# Patient Record
Sex: FEMALE | Race: WHITE | NOT HISPANIC OR LATINO | Employment: OTHER | URBAN - NONMETROPOLITAN AREA
[De-identification: names, ages, dates, MRNs, and addresses within clinical notes are randomized per-mention and may not be internally consistent; named-entity substitution may affect disease eponyms.]

---

## 2018-06-06 ENCOUNTER — INITIAL PRENATAL (OUTPATIENT)
Dept: OBSTETRICS AND GYNECOLOGY | Facility: CLINIC | Age: 23
End: 2018-06-06

## 2018-06-06 VITALS — BODY MASS INDEX: 25.06 KG/M2 | SYSTOLIC BLOOD PRESSURE: 120 MMHG | WEIGHT: 160 LBS | DIASTOLIC BLOOD PRESSURE: 68 MMHG

## 2018-06-06 DIAGNOSIS — Z34.91 NORMAL PREGNANCY, FIRST TRIMESTER: ICD-10-CM

## 2018-06-06 DIAGNOSIS — O36.80X0 ENCOUNTER TO DETERMINE FETAL VIABILITY OF PREGNANCY, SINGLE OR UNSPECIFIED FETUS: Primary | ICD-10-CM

## 2018-06-06 DIAGNOSIS — Z34.91 PREGNANT AND NOT YET DELIVERED IN FIRST TRIMESTER: ICD-10-CM

## 2018-06-06 PROCEDURE — 0501F PRENATAL FLOW SHEET: CPT | Performed by: NURSE PRACTITIONER

## 2018-06-06 RX ORDER — PRENATAL 71/IRON/FOLIC AC/DHA 30-1.4-2
1 CAPSULE,IMMEDIATE, DELAY RELEASE,BIPHASE ORAL DAILY
Qty: 90 CAPSULE | Refills: 3 | Status: SHIPPED | OUTPATIENT
Start: 2018-06-06 | End: 2022-02-11 | Stop reason: HOSPADM

## 2018-06-06 NOTE — PROGRESS NOTES
Chief Complaint   Patient presents with   • Initial Prenatal Visit     LMP 18, 8w5d by scan today, needs PNV, states has been trying for conception for almost 1 year         HPI  , 8w5d presents to our office today for initial prenatal visit.  She reports 1st trimester discomforts of pregnancy including n/v, fatigue.        Past Medical History:   Diagnosis Date   • Patient denies medical problems       No current outpatient prescriptions on file.   No Known Allergies   History reviewed. No pertinent surgical history.    Social History     Social History   • Marital status:      Spouse name: N/A   • Number of children: N/A   • Years of education: N/A     Occupational History   • Not on file.     Social History Main Topics   • Smoking status: Never Smoker   • Smokeless tobacco: Never Used   • Alcohol use No   • Drug use: No   • Sexual activity: Defer     Other Topics Concern   • Not on file     Social History Narrative   • No narrative on file      Family History   Problem Relation Age of Onset   • Diabetes Mother    • No Known Problems Father    • No Known Problems Daughter    • Hypertension Maternal Uncle    • Hypertension Maternal Grandmother    • Hypertension Maternal Grandfather        The following portions of the patient's history were reviewed and updated as appropriate:problem list, current medications, allergies, past family history, past medical history, past social history and past surgical history.    ROS    Pertinent items are noted in HPI, all other systems reviewed and negative    Physical Exam  /68   Wt 72.6 kg (160 lb)   LMP 2018   BMI 25.06 kg/m²        Psych: Altert and oriented to time, place and person  Mood and affect appropriate   General: well developed; well nourished  no acute distress  Head: normocephalic  Neck: The neck is supple and the trachea is midline  Musculoskeletal: Normal gait  Full range of motion  Lungs:  breathing is unlabored  Back: Negative  CVAT  Abdomen: Soft, non-tender, no organomegaly  Lower Extremities: LE: Neg edema  Genitourinary: External Genitalia without erythema, lesions, or masses, Perineum is without inflammation or lesions      MDM  Impression:  Problems/Risks: Normal Pregnancy  Discomforts of pregnancy   Tests done today: Routine NOB labs / U/A Culture / GC/CT - option for CF screening   TVS    Topics discussed: Rout NOB education including nutrition, exercise, OTCmeds, genetic screening   Phenergan  Encourage questions & answered    Tests next visit: none

## 2018-06-07 DIAGNOSIS — Z11.3 SCREENING EXAMINATION FOR STD (SEXUALLY TRANSMITTED DISEASE): Primary | ICD-10-CM

## 2018-06-07 LAB
ABO GROUP BLD: (no result)
BASOPHILS # BLD AUTO: 0 X10E3/UL (ref 0–0.2)
BASOPHILS NFR BLD AUTO: 0 %
BLD GP AB SCN SERPL QL: NEGATIVE
EOSINOPHIL # BLD AUTO: 0.1 X10E3/UL (ref 0–0.4)
EOSINOPHIL NFR BLD AUTO: 1 %
ERYTHROCYTE [DISTWIDTH] IN BLOOD BY AUTOMATED COUNT: 12.9 % (ref 12.3–15.4)
HBV SURFACE AG SERPL QL IA: NEGATIVE
HCT VFR BLD AUTO: 34.7 % (ref 34–46.6)
HGB BLD-MCNC: 12.1 G/DL (ref 11.1–15.9)
HIV 1+2 AB+HIV1 P24 AG SERPL QL IA: NON REACTIVE
IMM GRANULOCYTES # BLD: 0 X10E3/UL (ref 0–0.1)
IMM GRANULOCYTES NFR BLD: 0 %
LYMPHOCYTES # BLD AUTO: 2.5 X10E3/UL (ref 0.7–3.1)
LYMPHOCYTES NFR BLD AUTO: 29 %
MCH RBC QN AUTO: 31.3 PG (ref 26.6–33)
MCHC RBC AUTO-ENTMCNC: 34.9 G/DL (ref 31.5–35.7)
MCV RBC AUTO: 90 FL (ref 79–97)
MONOCYTES # BLD AUTO: 0.5 X10E3/UL (ref 0.1–0.9)
MONOCYTES NFR BLD AUTO: 6 %
NEUTROPHILS # BLD AUTO: 5.3 X10E3/UL (ref 1.4–7)
NEUTROPHILS NFR BLD AUTO: 64 %
PLATELET # BLD AUTO: 210 X10E3/UL (ref 150–379)
RBC # BLD AUTO: 3.86 X10E6/UL (ref 3.77–5.28)
RH BLD: NEGATIVE
RPR SER QL: NON REACTIVE
RUBV IGG SERPL IA-ACNC: 1.62 INDEX
WBC # BLD AUTO: 8.4 X10E3/UL (ref 3.4–10.8)

## 2018-06-09 LAB
BACTERIA UR CULT: NO GROWTH
BACTERIA UR CULT: NORMAL

## 2018-06-11 LAB
C TRACH RRNA SPEC QL NAA+PROBE: NEGATIVE
HSV1 DNA SPEC QL NAA+PROBE: NEGATIVE
HSV2 DNA SPEC QL NAA+PROBE: NEGATIVE
N GONORRHOEA RRNA SPEC QL NAA+PROBE: NEGATIVE
T VAGINALIS RRNA SPEC QL NAA+PROBE: NEGATIVE

## 2018-06-12 PROBLEM — Z67.91 RH NEGATIVE STATUS DURING PREGNANCY: Status: ACTIVE | Noted: 2018-06-06

## 2018-06-12 PROBLEM — O26.899 RH NEGATIVE STATUS DURING PREGNANCY: Status: ACTIVE | Noted: 2018-06-06

## 2018-07-02 ENCOUNTER — ROUTINE PRENATAL (OUTPATIENT)
Dept: OBSTETRICS AND GYNECOLOGY | Facility: CLINIC | Age: 23
End: 2018-07-02

## 2018-07-02 VITALS — WEIGHT: 163 LBS | DIASTOLIC BLOOD PRESSURE: 66 MMHG | SYSTOLIC BLOOD PRESSURE: 112 MMHG | BODY MASS INDEX: 25.53 KG/M2

## 2018-07-02 DIAGNOSIS — Z34.81 ENCOUNTER FOR SUPERVISION OF OTHER NORMAL PREGNANCY IN FIRST TRIMESTER: Primary | ICD-10-CM

## 2018-07-02 PROCEDURE — 0502F SUBSEQUENT PRENATAL CARE: CPT | Performed by: OBSTETRICS & GYNECOLOGY

## 2018-07-02 NOTE — PROGRESS NOTES
Chief Complaint   Patient presents with   • Routine Prenatal Visit     No COmplaints        HPI:   , 12w3d gestation reports doing well  SLight NVP    ROS:  See Prenatal Episode/Flowsheet  /66   Wt 73.9 kg (163 lb)   LMP 2018   BMI 25.53 kg/m²      EXAM:  EXTREMITIES:  No swelling-See Prenatal Episode/Flowsheet    ABDOMEN:  FHTs/Movement noted-See Prenatal Episode/Flowsheet    URINE GLUCOSE/PROTEIN:  See Prenatal Episode/Flowsheet    PELVIC EXAM:  See Prenatal Episode/Flowsheet  CV:  Lungs:    MDM:    Lab Results   Component Value Date    HGB 12.1 2018    RUBELLAABIGG 1.62 2018    HEPBSAG Negative 2018    ABO O 2018    RH Negative 2018    ABSCRN Negative 2018    KRS1TJJ2 Non Reactive 2018    URINECX Final report 2018       U/S:    1. IUP 12w3d  2. Routine care   3. Flinstone chewable

## 2018-07-30 ENCOUNTER — ROUTINE PRENATAL (OUTPATIENT)
Dept: OBSTETRICS AND GYNECOLOGY | Facility: CLINIC | Age: 23
End: 2018-07-30

## 2018-07-30 VITALS — DIASTOLIC BLOOD PRESSURE: 66 MMHG | WEIGHT: 163 LBS | SYSTOLIC BLOOD PRESSURE: 124 MMHG | BODY MASS INDEX: 25.53 KG/M2

## 2018-07-30 DIAGNOSIS — Z34.92 NORMAL PREGNANCY, SECOND TRIMESTER: Primary | ICD-10-CM

## 2018-07-30 PROCEDURE — 0502F SUBSEQUENT PRENATAL CARE: CPT | Performed by: NURSE PRACTITIONER

## 2018-07-30 NOTE — PROGRESS NOTES
[id number]  Chief Complaint   Patient presents with   • Routine Prenatal Visit     no complaints         HPI  , 16w3d reports she is doing well and has no c/o.      ROS  /66   Wt 73.9 kg (163 lb)   LMP 2018   BMI 25.53 kg/m²  -See Prenatal Assessment    ROS:  GI: Nausea - improved as compared to the prior visit; Constipation - No; Diarrhea - No    Neuro: Headache - No; Visual change - No      EXAM  General Appearance:  Lungs: Breathing unlabored  Abdomen:  See flow sheet for Fundal ht, FM, FHT's  LE: Neg edema    MDM  Impression:  Problems/Risk Normal Pregnancy     Tests done today: NIPS - discussed & optional  MSAFP - discussed & optional   Topics discussed: encouraged questions - call prn    Tests next visit: U/S      OB History      Para Term  AB Living    2 1 1     1    SAB TAB Ectopic Molar Multiple Live Births              1          Past Medical History:   Diagnosis Date   • Patient denies medical problems    • Rh negative status during pregnancy 2018       No past surgical history on file.    Family History   Problem Relation Age of Onset   • Diabetes Mother    • No Known Problems Father    • No Known Problems Daughter    • Hypertension Maternal Uncle    • Hypertension Maternal Grandmother    • Hypertension Maternal Grandfather        Social History     Social History   • Marital status:      Spouse name: N/A   • Number of children: N/A   • Years of education: N/A     Occupational History   • Not on file.     Social History Main Topics   • Smoking status: Never Smoker   • Smokeless tobacco: Never Used   • Alcohol use No   • Drug use: No   • Sexual activity: Defer     Other Topics Concern   • Not on file     Social History Narrative   • No narrative on file

## 2018-08-13 ENCOUNTER — ROUTINE PRENATAL (OUTPATIENT)
Dept: OBSTETRICS AND GYNECOLOGY | Facility: CLINIC | Age: 23
End: 2018-08-13

## 2018-08-13 VITALS — WEIGHT: 168 LBS | SYSTOLIC BLOOD PRESSURE: 124 MMHG | DIASTOLIC BLOOD PRESSURE: 62 MMHG | BODY MASS INDEX: 26.31 KG/M2

## 2018-08-13 DIAGNOSIS — Z34.92 SECOND TRIMESTER PREGNANCY: Primary | ICD-10-CM

## 2018-08-13 PROCEDURE — 0502F SUBSEQUENT PRENATAL CARE: CPT | Performed by: OBSTETRICS & GYNECOLOGY

## 2018-08-13 NOTE — PROGRESS NOTES
Chief Complaint   Patient presents with   • Routine Prenatal Visit     ANATOMY SCAN TODAY, NO COMPLAINTS.         HPI:   , 18w3d gestation reports doing well    ROS:  See Prenatal Episode/Flowsheet  /62   Wt 76.2 kg (168 lb)   LMP 2018   BMI 26.31 kg/m²      EXAM:  EXTREMITIES:  No swelling-See Prenatal Episode/Flowsheet    ABDOMEN:  FHTs/Movement noted-See Prenatal Episode/Flowsheet    URINE GLUCOSE/PROTEIN:  See Prenatal Episode/Flowsheet    PELVIC EXAM:  See Prenatal Episode/Flowsheet  CV:  Lungs:    MDM:    Lab Results   Component Value Date    HGB 12.1 2018    RUBELLAABIGG 1.62 2018    HEPBSAG Negative 2018    ABO O 2018    RH Negative 2018    ABSCRN Negative 2018    SDH1YMC2 Non Reactive 2018    URINECX Final report 2018       U/S: images reivewed, normal anatomy, posterior placenta, vertex  1. IUP 18w3d  2. Routine care   3. Penta today

## 2018-08-16 LAB
2ND TRIMESTER 4 SCREEN SERPL-IMP: NORMAL
2ND TRIMESTER 4 SCREEN SERPL-IMP: NORMAL
AFP ADJ MOM SERPL: 1.1
AFP SERPL-MCNC: 46 NG/ML
AGE AT DELIVERY: 25.7 YR
FET TS 18 RISK FROM MAT AGE: NORMAL
FET TS 21 RISK FROM MAT AGE: 998
GA METHOD: NORMAL
GA: 18.4 WEEKS
HCG ADJ MOM SERPL: 0.39
HCG SERPL-ACNC: 9585 MIU/ML
IDDM PATIENT QL: NO
INHIBIN A ADJ MOM SERPL: 0.62
INHIBIN A SERPL-MCNC: 98.31 PG/ML
LABORATORY COMMENT REPORT: NORMAL
MULTIPLE PREGNANCY: NO
NEURAL TUBE DEFECT RISK FETUS: 8933 %
RESULT: NORMAL
TS 18 RISK FETUS: NORMAL
TS 21 RISK FETUS: NORMAL
U ESTRIOL ADJ MOM SERPL: 1.43
U ESTRIOL SERPL-MCNC: 1.9 NG/ML

## 2018-09-10 ENCOUNTER — ROUTINE PRENATAL (OUTPATIENT)
Dept: OBSTETRICS AND GYNECOLOGY | Facility: CLINIC | Age: 23
End: 2018-09-10

## 2018-09-10 VITALS — WEIGHT: 171 LBS | SYSTOLIC BLOOD PRESSURE: 112 MMHG | BODY MASS INDEX: 26.78 KG/M2 | DIASTOLIC BLOOD PRESSURE: 64 MMHG

## 2018-09-10 DIAGNOSIS — Z34.92 PRENATAL CARE IN SECOND TRIMESTER: Primary | ICD-10-CM

## 2018-09-10 PROCEDURE — 0502F SUBSEQUENT PRENATAL CARE: CPT | Performed by: OBSTETRICS & GYNECOLOGY

## 2018-09-11 NOTE — PROGRESS NOTES
Chief Complaint   Patient presents with   • Routine Prenatal Visit     C/O SHORTNESS OF BREATH, NAUSEA, DIZZINESS AND MILD CRAMPS X 2 WEEKS       HPI:   Camille is a  currently at 22w3d who today reports the following:  Contractions - No; Leaking - No; Vaginal bleeding -  No; Swelling of extremities - No. Good fetal movement - YES.    ROS:  GI: Nausea - No; Constipation - No; Diarrhea - No. RUQ pain - No    Neuro: Headache - No; Visual disturbances - No.    The following portions of the patient's history were reviewed and updated as appropriate:problem list, current medications, allergies, past family history, past medical history, past social history and past surgical history.    EXAM:  /64   Wt 77.6 kg (171 lb)   LMP 2018   BMI 26.78 kg/m²     Gen: NAD, conversant  Pulm: No use of accessory muscles, normal respirations  Abdomen: Gravid, nontender, size = dates, + fetal cardiac activity  Ext: no edema, no rashes, WWP  Gait: normal for pregnancy  Psych: Mood, insight, judgement intact  SVE: Not performed     Lab Results   Component Value Date    ABO O 2018    RH Negative 2018    ABSCRN Negative 2018       Smoking status: Never Smoker                                                              Smokeless tobacco: Never Used                          I have reviewed the prenatal labs and previous ultrasounds today.    MDM:  Diagnosis: Supervision of low risk pregnancy   Tests/Orders today: Orders Placed This Encounter   Procedures   • Gestational Screen 1 Hr ([facility])   • CBC (No Diff)      Topics discussed: Contraception - OCPs   Breastfeeding   Tests next visit: GCT  HgB   Next visit: 4 week(s)     Travon Garcia MD  Obstetrics and Gynecology  [facility]

## 2018-10-08 ENCOUNTER — ROUTINE PRENATAL (OUTPATIENT)
Dept: OBSTETRICS AND GYNECOLOGY | Facility: CLINIC | Age: 23
End: 2018-10-08

## 2018-10-08 VITALS — WEIGHT: 175 LBS | DIASTOLIC BLOOD PRESSURE: 62 MMHG | BODY MASS INDEX: 27.41 KG/M2 | SYSTOLIC BLOOD PRESSURE: 120 MMHG

## 2018-10-08 DIAGNOSIS — Z34.92 NORMAL PREGNANCY, SECOND TRIMESTER: Primary | ICD-10-CM

## 2018-10-08 LAB
ERYTHROCYTE [DISTWIDTH] IN BLOOD BY AUTOMATED COUNT: 12.1 % (ref 11.5–14.5)
GLUCOSE 1H P 50 G GLC PO SERPL-MCNC: 99 MG/DL
HCT VFR BLD AUTO: 33 % (ref 37–47)
HGB BLD-MCNC: 11.2 G/DL (ref 12–16)
MCH RBC QN AUTO: 31.7 PG (ref 27–31)
MCHC RBC AUTO-ENTMCNC: 33.9 G/DL (ref 30–37)
MCV RBC AUTO: 93.5 FL (ref 81–99)
PLATELET # BLD AUTO: 179 10*3/MM3 (ref 130–400)
RBC # BLD AUTO: 3.53 10*6/MM3 (ref 4.2–5.4)
WBC # BLD AUTO: 8.95 10*3/MM3 (ref 4.8–10.8)

## 2018-10-08 PROCEDURE — 0502F SUBSEQUENT PRENATAL CARE: CPT | Performed by: NURSE PRACTITIONER

## 2018-10-08 NOTE — PATIENT INSTRUCTIONS
"Third Trimester of Pregnancy  The third trimester is from week 29 through week 42, months 7 through 9. The third trimester is a time when the fetus is growing rapidly. At the end of the ninth month, the fetus is about 20 inches in length and weighs 6-10 pounds.   BODY CHANGES  Your body goes through many changes during pregnancy. The changes vary from woman to woman.   · Your weight will continue to increase. You can expect to gain 25-35 pounds (11-16 kg) by the end of the pregnancy.  · You may begin to get stretch marks on your hips, abdomen, and breasts.  · You may urinate more often because the fetus is moving lower into your pelvis and pressing on your bladder.  · You may develop or continue to have heartburn as a result of your pregnancy.  · You may develop constipation because certain hormones are causing the muscles that push waste through your intestines to slow down.  · You may develop hemorrhoids or swollen, bulging veins (varicose veins).  · You may have pelvic pain because of the weight gain and pregnancy hormones relaxing your joints between the bones in your pelvis. Backaches may result from overexertion of the muscles supporting your posture.  · You may have changes in your hair. These can include thickening of your hair, rapid growth, and changes in texture. Some women also have hair loss during or after pregnancy, or hair that feels dry or thin. Your hair will most likely return to normal after your baby is born.  · Your breasts will continue to grow and be tender. A yellow discharge may leak from your breasts called colostrum.  · Your belly button may stick out.  · You may feel short of breath because of your expanding uterus.  · You may notice the fetus \"dropping,\" or moving lower in your abdomen.  · You may have a bloody mucus discharge. This usually occurs a few days to a week before labor begins.  · Your cervix becomes thin and soft (effaced) near your due date.  WHAT TO EXPECT AT YOUR PRENATAL " EXAMS   You will have prenatal exams every 2 weeks until week 36. Then, you will have weekly prenatal exams. During a routine prenatal visit:  · You will be weighed to make sure you and the fetus are growing normally.  · Your blood pressure is taken.  · Your abdomen will be measured to track your baby's growth.  · The fetal heartbeat will be listened to.  · Any test results from the previous visit will be discussed.  · You may have a cervical check near your due date to see if you have effaced.  At around 36 weeks, your caregiver will check your cervix. At the same time, your caregiver will also perform a test on the secretions of the vaginal tissue. This test is to determine if a type of bacteria, Group B streptococcus, is present. Your caregiver will explain this further.  Your caregiver may ask you:  · What your birth plan is.  · How you are feeling.  · If you are feeling the baby move.  · If you have had any abnormal symptoms, such as leaking fluid, bleeding, severe headaches, or abdominal cramping.  · If you are using any tobacco products, including cigarettes, chewing tobacco, and electronic cigarettes.  · If you have any questions.  Other tests or screenings that may be performed during your third trimester include:  · Blood tests that check for low iron levels (anemia).  · Fetal testing to check the health, activity level, and growth of the fetus. Testing is done if you have certain medical conditions or if there are problems during the pregnancy.  · HIV (human immunodeficiency virus) testing. If you are at high risk, you may be screened for HIV during your third trimester of pregnancy.  FALSE LABOR  You may feel small, irregular contractions that eventually go away. These are called Julien Uriarte contractions, or false labor. Contractions may last for hours, days, or even weeks before true labor sets in. If contractions come at regular intervals, intensify, or become painful, it is best to be seen by your  caregiver.   SIGNS OF LABOR   · Menstrual-like cramps.  · Contractions that are 5 minutes apart or less.  · Contractions that start on the top of the uterus and spread down to the lower abdomen and back.  · A sense of increased pelvic pressure or back pain.  · A watery or bloody mucus discharge that comes from the vagina.  If you have any of these signs before the 37th week of pregnancy, call your caregiver right away. You need to go to the hospital to get checked immediately.  HOME CARE INSTRUCTIONS   · Avoid all smoking, herbs, alcohol, and unprescribed drugs. These chemicals affect the formation and growth of the baby.  · Do not use any tobacco products, including cigarettes, chewing tobacco, and electronic cigarettes. If you need help quitting, ask your health care provider. You may receive counseling support and other resources to help you quit.  · Follow your caregiver's instructions regarding medicine use. There are medicines that are either safe or unsafe to take during pregnancy.  · Exercise only as directed by your caregiver. Experiencing uterine cramps is a good sign to stop exercising.  · Continue to eat regular, healthy meals.  · Wear a good support bra for breast tenderness.  · Do not use hot tubs, steam rooms, or saunas.  · Wear your seat belt at all times when driving.  · Avoid raw meat, uncooked cheese, cat litter boxes, and soil used by cats. These carry germs that can cause birth defects in the baby.  · Take your prenatal vitamins.  · Take 4431-9903 mg of calcium daily starting at the 20th week of pregnancy until you deliver your baby.  · Try taking a stool softener (if your caregiver approves) if you develop constipation. Eat more high-fiber foods, such as fresh vegetables or fruit and whole grains. Drink plenty of fluids to keep your urine clear or pale yellow.  · Take warm sitz baths to soothe any pain or discomfort caused by hemorrhoids. Use hemorrhoid cream if your caregiver approves.  · If  you develop varicose veins, wear support hose. Elevate your feet for 15 minutes, 3-4 times a day. Limit salt in your diet.  · Avoid heavy lifting, wear low heal shoes, and practice good posture.  · Rest a lot with your legs elevated if you have leg cramps or low back pain.  · Visit your dentist if you have not gone during your pregnancy. Use a soft toothbrush to brush your teeth and be gentle when you floss.  · A sexual relationship may be continued unless your caregiver directs you otherwise.  · Do not travel far distances unless it is absolutely necessary and only with the approval of your caregiver.  · Take prenatal classes to understand, practice, and ask questions about the labor and delivery.  · Make a trial run to the hospital.  · Pack your hospital bag.  · Prepare the baby's nursery.  · Continue to go to all your prenatal visits as directed by your caregiver.  SEEK MEDICAL CARE IF:  · You are unsure if you are in labor or if your water has broken.  · You have dizziness.  · You have mild pelvic cramps, pelvic pressure, or nagging pain in your abdominal area.  · You have persistent nausea, vomiting, or diarrhea.  · You have a bad smelling vaginal discharge.  · You have pain with urination.  SEEK IMMEDIATE MEDICAL CARE IF:   · You have a fever.  · You are leaking fluid from your vagina.  · You have spotting or bleeding from your vagina.  · You have severe abdominal cramping or pain.  · You have rapid weight loss or gain.  · You have shortness of breath with chest pain.  · You notice sudden or extreme swelling of your face, hands, ankles, feet, or legs.  · You have not felt your baby move in over an hour.  · You have severe headaches that do not go away with medicine.  · You have vision changes.     This information is not intended to replace advice given to you by your health care provider. Make sure you discuss any questions you have with your health care provider.     Document Released: 12/12/2002 Document  Revised: 01/08/2016 Document Reviewed: 02/18/2014  Elsevier Interactive Patient Education ©2017 [employer]

## 2018-10-08 NOTE — PROGRESS NOTES
[id number]  Chief Complaint   Patient presents with   • Routine Prenatal Visit     glucola today, no complaints         HPI  , 26w3d reports doing well.  Good FM    ROS  /62   Wt 79.4 kg (175 lb)   LMP 2018   BMI 27.41 kg/m²  -See Prenatal Assessment    ROS:      GI: Nausea - No; Constipation - No;    Diarrhea - No    Neuro: Headache - No; Visual change - No      EXAM  General Appearance:  Pleasant  Lungs: Breathing unlabored  Abdomen:  See flow sheet for Fundal ht, FM, FHT's  LE: Neg edema    MDM  Impression:  Problems/Risk Normal Pregnancy     Tests done today: 1 hr. glucola & CBC  antibody screen   Topics discussed: continue to note good FM  Rhogam in 2 wks   T-dap   encouraged questions - call prn    Tests next visit: none     OB History      Para Term  AB Living    2 1 1     1    SAB TAB Ectopic Molar Multiple Live Births              1          Past Medical History:   Diagnosis Date   • Patient denies medical problems    • Rh negative status during pregnancy 2018       Past Surgical History:   Procedure Laterality Date   • NO PAST SURGERIES         Family History   Problem Relation Age of Onset   • Diabetes Mother    • No Known Problems Father    • No Known Problems Daughter    • Hypertension Maternal Uncle    • Hypertension Maternal Grandmother    • Hypertension Maternal Grandfather        Social History     Social History   • Marital status:      Spouse name: N/A   • Number of children: N/A   • Years of education: N/A     Occupational History   • Not on file.     Social History Main Topics   • Smoking status: Never Smoker   • Smokeless tobacco: Never Used   • Alcohol use No   • Drug use: No   • Sexual activity: Defer     Other Topics Concern   • Not on file     Social History Narrative   • No narrative on file

## 2018-10-16 ENCOUNTER — TELEPHONE (OUTPATIENT)
Dept: OBSTETRICS AND GYNECOLOGY | Facility: CLINIC | Age: 23
End: 2018-10-16

## 2018-10-16 RX ORDER — AZITHROMYCIN 250 MG/1
TABLET, FILM COATED ORAL
Qty: 6 TABLET | Refills: 0 | Status: SHIPPED | OUTPATIENT
Start: 2018-10-16 | End: 2018-11-05

## 2018-10-16 NOTE — TELEPHONE ENCOUNTER
Do Z-pack 5 day   Cool mist vaporizer  Off work x 48 hrs   Option to be seen or if fever / chills / worsens - then will need to be seen.  Thanks

## 2018-10-16 NOTE — TELEPHONE ENCOUNTER
Pt called and states she has had a deep cough for 3 weeks, states her chest is really tight and when she can cough up anything it is discolored. She has tried several OTC meds with no relief. Can we send in something for her?  Thanks

## 2018-10-29 ENCOUNTER — CLINICAL SUPPORT (OUTPATIENT)
Dept: OBSTETRICS AND GYNECOLOGY | Facility: CLINIC | Age: 23
End: 2018-10-29

## 2018-10-29 VITALS
DIASTOLIC BLOOD PRESSURE: 62 MMHG | HEIGHT: 65 IN | BODY MASS INDEX: 29.16 KG/M2 | SYSTOLIC BLOOD PRESSURE: 120 MMHG | WEIGHT: 175 LBS

## 2018-10-29 DIAGNOSIS — O26.892 RH NEGATIVE STATUS DURING PREGNANCY IN SECOND TRIMESTER: Primary | ICD-10-CM

## 2018-10-29 DIAGNOSIS — Z67.91 RH NEGATIVE STATUS DURING PREGNANCY IN SECOND TRIMESTER: Primary | ICD-10-CM

## 2018-10-29 PROCEDURE — 96372 THER/PROPH/DIAG INJ SC/IM: CPT | Performed by: OBSTETRICS & GYNECOLOGY

## 2018-11-05 ENCOUNTER — ROUTINE PRENATAL (OUTPATIENT)
Dept: OBSTETRICS AND GYNECOLOGY | Facility: CLINIC | Age: 23
End: 2018-11-05

## 2018-11-05 VITALS — SYSTOLIC BLOOD PRESSURE: 124 MMHG | DIASTOLIC BLOOD PRESSURE: 68 MMHG | WEIGHT: 179 LBS | BODY MASS INDEX: 29.79 KG/M2

## 2018-11-05 DIAGNOSIS — Z34.93 NORMAL PREGNANCY, THIRD TRIMESTER: Primary | ICD-10-CM

## 2018-11-05 PROCEDURE — 0502F SUBSEQUENT PRENATAL CARE: CPT | Performed by: NURSE PRACTITIONER

## 2018-11-05 NOTE — PROGRESS NOTES
[id number]  Chief Complaint   Patient presents with   • Routine Prenatal Visit     pt states fell 2 weeks ago and is still having back pain         HPI  , 30w3d reports fell 2 wks ago - c/o right mid back pain - think its pulled muscle   Denies cramping - no vaginal bleeding  Good FM    ROS  /68   Wt 81.2 kg (179 lb)   LMP 2018   BMI 29.79 kg/m²  -See Prenatal Assessment    ROS:      GI: Nausea - No; Constipation - No;    Diarrhea - No    Neuro: Headache - No; Visual change - No      EXAM  General Appearance:  Pleasant  Back:  Neg CVAT  Lungs: Breathing unlabored  Abdomen:  See flow sheet for Fundal ht, FM, FHT's  LE: Neg edema    MDM  Impression:  Problems/Risk Normal Pregnancy  back pain   Tests done today: none   Topics discussed: continue to note good FM  Flu vaccination  T-dap   anticipate rt mid back pain to resolve - may use warm compresses - tylenol - if pain persist or worsens - will need to consider renal u/s   encouraged questions - call prn    Tests next visit: none     OB History      Para Term  AB Living    2 1 1     1    SAB TAB Ectopic Molar Multiple Live Births              1          Past Medical History:   Diagnosis Date   • Patient denies medical problems    • Rh negative status during pregnancy 2018       Past Surgical History:   Procedure Laterality Date   • NO PAST SURGERIES         Family History   Problem Relation Age of Onset   • Diabetes Mother    • No Known Problems Father    • No Known Problems Daughter    • Hypertension Maternal Uncle    • Hypertension Maternal Grandmother    • Hypertension Maternal Grandfather        Social History     Social History   • Marital status:      Spouse name: N/A   • Number of children: N/A   • Years of education: N/A     Occupational History   • Not on file.     Social History Main Topics   • Smoking status: Never Smoker   • Smokeless tobacco: Never Used   • Alcohol use No   • Drug use: No   • Sexual activity:  Defer     Other Topics Concern   • Not on file     Social History Narrative   • No narrative on file

## 2018-11-13 ENCOUNTER — ROUTINE PRENATAL (OUTPATIENT)
Dept: OBSTETRICS AND GYNECOLOGY | Facility: CLINIC | Age: 23
End: 2018-11-13

## 2018-11-13 VITALS — SYSTOLIC BLOOD PRESSURE: 124 MMHG | BODY MASS INDEX: 29.45 KG/M2 | DIASTOLIC BLOOD PRESSURE: 64 MMHG | WEIGHT: 177 LBS

## 2018-11-13 DIAGNOSIS — O47.00 PREMATURE UTERINE CONTRACTIONS: ICD-10-CM

## 2018-11-13 DIAGNOSIS — Z34.83 ENCOUNTER FOR SUPERVISION OF OTHER NORMAL PREGNANCY IN THIRD TRIMESTER: Primary | ICD-10-CM

## 2018-11-13 PROCEDURE — 0502F SUBSEQUENT PRENATAL CARE: CPT | Performed by: OBSTETRICS & GYNECOLOGY

## 2018-11-14 NOTE — PROGRESS NOTES
Chief Complaint   Patient presents with   • Pregnancy Problem     Patient complain of pressure and contractions.        HPI: Camille is a  currently at 31w5d who today reports the following:  Contractions - YES - but less than 4/hour AND no associated change in vaginal discharge; Leaking - No; Vaginal bleeding -  No; Heartburn - No.  Pt with complaints of contractions and pressure over the weekend.  Pt was unable to time but reports perhaps 3-4 per hour at most.  Pt with no bleeding or spotting; denies leaking of fluid.  Pt reports symptoms are worse with standing and activity.  Pt with no prior history of  labor; delivered at term previously.  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  ROS:   GI:   Nausea - No; Constipation - No; Diarrhea - No.   Neuro:  Headache - No; Visual disturbances - No.    EXAM:   Vitals:  See prenatal flowsheet as noted and reviewed   Abdomen:   See prenatal flowsheet as noted and reviewed   Pelvic:  See prenatal flowsheet as noted and reviewed   Urine:  See prenatal flowsheet as noted and reviewed     Lab Results   Component Value Date    ABO O 2018    RH Negative 2018    ABSCRN Negative 2018       MDM:  Impression: Supervision of low risk pregnancy   contractions with no cervical dilitation   Tests done today: none   Topics discussed: kick counts and fetal movement  PIH precautions   labor signs and symptoms - increase rest and po fluids  Instructions and precautions given; keep appt as scheduled on Monday   Tests next visit: none     This note was electronically signed.  Tatiana Gray M.D.

## 2018-11-19 ENCOUNTER — ROUTINE PRENATAL (OUTPATIENT)
Dept: OBSTETRICS AND GYNECOLOGY | Facility: CLINIC | Age: 23
End: 2018-11-19

## 2018-11-19 VITALS — WEIGHT: 179 LBS | BODY MASS INDEX: 29.79 KG/M2 | SYSTOLIC BLOOD PRESSURE: 126 MMHG | DIASTOLIC BLOOD PRESSURE: 70 MMHG

## 2018-11-19 DIAGNOSIS — Z34.93 PRENATAL CARE IN THIRD TRIMESTER: Primary | ICD-10-CM

## 2018-11-19 PROCEDURE — 0502F SUBSEQUENT PRENATAL CARE: CPT | Performed by: OBSTETRICS & GYNECOLOGY

## 2018-11-19 NOTE — PROGRESS NOTES
Chief Complaint   Patient presents with   • Pregnancy Ultrasound     Growth scan done today, No complaints       HPI:   Camille is a  currently at 32w3d who today reports the following:  Contractions - No; Leaking - No; Vaginal bleeding -  No; Swelling of extremities - No. Good fetal movement - YES.    ROS:  GI: Nausea - No; Constipation - No; Diarrhea - No. RUQ pain - No    Neuro: Headache - No; Visual disturbances - No.    Pertinent items are noted in HPI, all other systems reviewed and negative    Review of History:  The following portions of the patient's history were reviewed and updated as appropriate:problem list, current medications, allergies, past family history, past medical history, past social history and past surgical history.    Current Outpatient Medications on File Prior to Visit   Medication Sig Dispense Refill   • Aahljm-NoBrh-Nmdlm-FA-DHA w/oA (VITAPEARL) 30-1.4-200 MG capsule controlled-release Take 1 capsule by mouth Daily. 90 capsule 3     No current facility-administered medications on file prior to visit.        EXAM:  /70   Wt 81.2 kg (179 lb)   LMP 2018   BMI 29.79 kg/m²     Gen: NAD, conversant  Pulm: No use of accessory muscles, normal respirations  Abdomen: Gravid, nontender, size = dates, + fetal cardiac activity  Ext: no edema, no rashes, WWP  Gait: normal for pregnancy  Psych: Mood, insight, judgement intact  SVE: Not performed     Lab Results   Component Value Date    ABO O 2018    RH Negative 2018    ABSCRN Negative 2018       Social History    Tobacco Use      Smoking status: Never Smoker      Smokeless tobacco: Never Used      I have reviewed the prenatal labs and previous ultrasounds today.    MDM:  Diagnosis: Supervision of low risk pregnancy   Rh NEG   Tests/Orders/Rx today: IUP at 32+3 weeks. Limited anatomy was reviewed today and is normal in appearance. EFW 2032g(48%). Cephalic presentation. Placenta is posterior. Amniotic fluid and  fetal heart rate are normal.  Meds Ordered: none   Topics discussed: kick counts and fetal movement  LARC  PIH precautions   labor signs and symptoms   Tests next visit: none   Next visit: 2 week(s)     Travon Garcia MD  Obstetrics and Gynecology  [facility]

## 2018-11-20 ENCOUNTER — APPOINTMENT (OUTPATIENT)
Dept: MRI IMAGING | Facility: HOSPITAL | Age: 23
End: 2018-11-20

## 2018-11-20 ENCOUNTER — HOSPITAL ENCOUNTER (OUTPATIENT)
Facility: HOSPITAL | Age: 23
Discharge: HOME | End: 2018-11-20
Attending: NURSE PRACTITIONER | Admitting: NURSE PRACTITIONER

## 2018-11-20 VITALS
DIASTOLIC BLOOD PRESSURE: 57 MMHG | TEMPERATURE: 98.4 F | HEART RATE: 82 BPM | HEIGHT: 67 IN | RESPIRATION RATE: 14 BRPM | BODY MASS INDEX: 28.09 KG/M2 | OXYGEN SATURATION: 98 % | WEIGHT: 179 LBS | SYSTOLIC BLOOD PRESSURE: 107 MMHG

## 2018-11-20 LAB
ALBUMIN SERPL-MCNC: 3.6 G/DL (ref 3.5–5)
ALBUMIN/GLOB SERPL: 1.3 G/DL (ref 1–2)
ALP SERPL-CCNC: 74 U/L (ref 38–126)
ALT SERPL W P-5'-P-CCNC: 16 U/L (ref 13–69)
ANION GAP SERPL CALCULATED.3IONS-SCNC: 9.1 MMOL/L (ref 10–20)
AST SERPL-CCNC: 20 U/L (ref 15–46)
BILIRUB BLD-MCNC: NEGATIVE MG/DL
BILIRUB SERPL-MCNC: 0.5 MG/DL (ref 0.2–1.3)
BUN BLD-MCNC: 7 MG/DL (ref 7–20)
BUN/CREAT SERPL: 11.7 (ref 7.1–23.5)
CALCIUM SPEC-SCNC: 9.1 MG/DL (ref 8.4–10.2)
CHLORIDE SERPL-SCNC: 107 MMOL/L (ref 98–107)
CLARITY, POC: CLEAR
CO2 SERPL-SCNC: 22 MMOL/L (ref 26–30)
COLOR UR: YELLOW
CREAT BLD-MCNC: 0.6 MG/DL (ref 0.6–1.3)
DEPRECATED RDW RBC AUTO: 41.9 FL (ref 37–54)
EOSINOPHIL # BLD MANUAL: 0.19 10*3/MM3 (ref 0–0.7)
EOSINOPHIL NFR BLD MANUAL: 2 % (ref 0–7)
ERYTHROCYTE [DISTWIDTH] IN BLOOD BY AUTOMATED COUNT: 12.3 % (ref 11.5–14.5)
GFR SERPL CREATININE-BSD FRML MDRD: 122 ML/MIN/1.73
GLOBULIN UR ELPH-MCNC: 2.7 GM/DL
GLUCOSE BLD-MCNC: 82 MG/DL (ref 74–98)
GLUCOSE BLDC GLUCOMTR-MCNC: 84 MG/DL (ref 70–130)
GLUCOSE UR STRIP-MCNC: NEGATIVE MG/DL
HCT VFR BLD AUTO: 32.4 % (ref 37–47)
HGB BLD-MCNC: 11.2 G/DL (ref 12–16)
KETONES UR QL: NEGATIVE
LARGE PLATELETS: NORMAL
LEUKOCYTE EST, POC: ABNORMAL
LYMPHOCYTES # BLD MANUAL: 3.02 10*3/MM3 (ref 0.6–3.4)
LYMPHOCYTES NFR BLD MANUAL: 31 % (ref 10–50)
LYMPHOCYTES NFR BLD MANUAL: 6 % (ref 0–12)
MCH RBC QN AUTO: 32.3 PG (ref 27–31)
MCHC RBC AUTO-ENTMCNC: 34.6 G/DL (ref 30–37)
MCV RBC AUTO: 93.4 FL (ref 81–99)
MONOCYTES # BLD AUTO: 0.58 10*3/MM3 (ref 0–0.9)
NEUTROPHILS # BLD AUTO: 5.94 10*3/MM3 (ref 2–6.9)
NEUTROPHILS NFR BLD MANUAL: 60 % (ref 37–80)
NEUTS BAND NFR BLD MANUAL: 1 % (ref 0–5)
NITRITE UR-MCNC: NEGATIVE MG/ML
PH UR: 7 [PH] (ref 5–8)
PLATELET # BLD AUTO: 158 10*3/MM3 (ref 130–400)
PMV BLD AUTO: 10.8 FL (ref 6–12)
POTASSIUM BLD-SCNC: 4.1 MMOL/L (ref 3.5–5.1)
PROT SERPL-MCNC: 6.3 G/DL (ref 6.3–8.2)
PROT UR STRIP-MCNC: NEGATIVE MG/DL
RBC # BLD AUTO: 3.47 10*6/MM3 (ref 4.2–5.4)
RBC # UR STRIP: NEGATIVE /UL
RBC MORPH BLD: NORMAL
SMALL PLATELETS BLD QL SMEAR: ADEQUATE
SODIUM BLD-SCNC: 134 MMOL/L (ref 137–145)
SP GR UR: 1 (ref 1–1.03)
UROBILINOGEN UR QL: NORMAL
WBC MORPH BLD: NORMAL
WBC NRBC COR # BLD: 9.73 10*3/MM3 (ref 4.8–10.8)

## 2018-11-20 PROCEDURE — G0463 HOSPITAL OUTPT CLINIC VISIT: HCPCS

## 2018-11-20 PROCEDURE — 85027 COMPLETE CBC AUTOMATED: CPT | Performed by: NURSE PRACTITIONER

## 2018-11-20 PROCEDURE — 36415 COLL VENOUS BLD VENIPUNCTURE: CPT | Performed by: NURSE PRACTITIONER

## 2018-11-20 PROCEDURE — 59025 FETAL NON-STRESS TEST: CPT | Performed by: NURSE PRACTITIONER

## 2018-11-20 PROCEDURE — 85007 BL SMEAR W/DIFF WBC COUNT: CPT | Performed by: NURSE PRACTITIONER

## 2018-11-20 PROCEDURE — 82962 GLUCOSE BLOOD TEST: CPT

## 2018-11-20 PROCEDURE — 59025 FETAL NON-STRESS TEST: CPT

## 2018-11-20 PROCEDURE — 80053 COMPREHEN METABOLIC PANEL: CPT | Performed by: NURSE PRACTITIONER

## 2018-11-20 PROCEDURE — 81002 URINALYSIS NONAUTO W/O SCOPE: CPT | Performed by: NURSE PRACTITIONER

## 2018-11-20 NOTE — PROGRESS NOTES
Dr. Rueda here to see pt.   After evaluation - pt informed Dr. Rueda she desires not to have EEG or MRI - states if any other incidents will do further testing.  She is feeling well - she denies any symptoms previously felt.    Camille's , mother, & sister in attendance.  Pt discharged home with instruction adequate rest and fluids - continue to note good FM  Dr. Rueda informed Camille if she has any concerns or further symptoms to call   She and family were encouraged questions and answered

## 2018-11-20 NOTE — H&P
Kimo  Camille Still  : 1995  MRN: [id number]  CSN: [id number]    Chief Complain:  numbness right side of body    History and Physical    Camille Still is a 23 y.o. year old  with an Estimated Date of Delivery: 19 currently at 32w4d presenting with c/o tingling rt hand and extended to arm, jaw and lips and tongue - caused my vision to be blurry - speech slurred and disoriented.  She states now has a h/a and still slightly disoriented - had episode on  but not as extreme     Prenatal care has been with [facility].   Prenatal course has been uncomplicated .      Obstetric History       T1      L1     SAB0   TAB0   Ectopic0   Molar0   Multiple0   Live Births1       # Outcome Date GA Lbr Barry/2nd Weight Sex Delivery Anes PTL Lv   2 Current            1 Term 16 39w0d  3629 g (8 lb) F Vag-Spont EPI  TAYLOR        Past Medical History:   Diagnosis Date   • Patient denies medical problems    • Rh negative status during pregnancy 2018     Past Surgical History:   Procedure Laterality Date   • NO PAST SURGERIES       Family Hx:  No immediate family hx of significance       Review of Systems        Pertinent items are noted in HPI, all other systems reviewed and negative  No Known Allergies  Social History    Tobacco Use      Smoking status: Never Smoker      Smokeless tobacco: Never Used      /68   Pulse 83   LMP 2018   SpO2 99%     Physical Exam       Psych: Altert and oriented to time, place and person  Mood and affect appropriate   General: well developed; well nourished  no acute distress - slightly anxious  Head: normocephalic  HEENT: face symmetrical and PERRLA  Speech is clear at this time   Musculoskeletal: Full ROM - rt hand  minimal / good hand    Heart: regular rate and rhythm, no murmur  Lungs:  breathing is unlabored  clear to auscultation bilaterally  Abdomen: Gravid - soft and non-tender   S=D  no  contractions   FHT's 130 + accels and variability   Lower Extremities: Bilaterally - Neg edema, DTR's 2+, Neg. Clonus and no calf tenderness, strength is equal bilaterally   Skin: warm and dry  V/E:  Deferred                  Prenatal Labs  Lab Results   Component Value Date    HGB 11.2 (L) 10/08/2018    HEPBSAG Negative 06/06/2018    ABSCRN Negative 06/06/2018    [id number] Non Reactive 06/06/2018    URINECX Final report 06/06/2018       Current Labs Reviewed   No data reviewed    Assessment:    1. IUP at 32w4d  2. possible neurological incident  3. FHT's reassuring          Plan:  Spoke with Dr. Garcia in office - Neuro consult in     Spoke with Dr. Rueda - CBC - CMP - EEG - MRI/DWI            Apple Dominique CNM  11/20/2018  2:21 PM

## 2018-11-20 NOTE — NURSING NOTE
After MD evaluation, Dr. Rueda stated that patient had experienced a migraine headache.  ANTHONY Ivory, cancelled EEG order and MRI order after MD evaluation.  Patient may be discharged home. - Alice Worthington RN.

## 2018-11-20 NOTE — NON STRESS TEST
Camille Simmonsard, a  at 32w4d with an JUAN LUIS of 2019, by Ultrasound, was seen at [facility] for a nonstress test.    Chief Complaint   Patient presents with   • Non-stress Test     Pt complains of dizziness and lightheadness       Patient Active Problem List   Diagnosis   • Rh negative status during pregnancy       Start Time: 1346  Stop Time: 1400    Interpretation A  Nonstress Test Interpretation A: Reactive (18 1624 : Alice Worthington RN)

## 2018-11-20 NOTE — NURSING NOTE
Take home instructions reviewed with patient per Lashaun Kirkland RN.  Patient verbalized understanding to instructions and signed.  Patient to follow up in office with next scheduled appointment.  If patient has any change in condition patient is to return to  for evaluation. - Alice Worthington RN.

## 2018-12-06 ENCOUNTER — ROUTINE PRENATAL (OUTPATIENT)
Dept: OBSTETRICS AND GYNECOLOGY | Facility: CLINIC | Age: 23
End: 2018-12-06

## 2018-12-06 VITALS — WEIGHT: 179 LBS | BODY MASS INDEX: 28.04 KG/M2 | SYSTOLIC BLOOD PRESSURE: 122 MMHG | DIASTOLIC BLOOD PRESSURE: 66 MMHG

## 2018-12-06 DIAGNOSIS — R06.2 WHEEZING ON INSPIRATION: Primary | ICD-10-CM

## 2018-12-06 DIAGNOSIS — Z34.83 ENCOUNTER FOR SUPERVISION OF OTHER NORMAL PREGNANCY IN THIRD TRIMESTER: ICD-10-CM

## 2018-12-06 PROCEDURE — 0502F SUBSEQUENT PRENATAL CARE: CPT | Performed by: MIDWIFE

## 2018-12-06 RX ORDER — ALBUTEROL SULFATE 90 UG/1
2 AEROSOL, METERED RESPIRATORY (INHALATION) EVERY 4 HOURS PRN
Qty: 1 INHALER | Refills: 0 | Status: SHIPPED | OUTPATIENT
Start: 2018-12-06 | End: 2020-05-20

## 2018-12-06 NOTE — PROGRESS NOTES
Chief Complaint   Patient presents with   • Routine Prenatal Visit     c/o cough for 2 weeks now, has completed z pack        HPI: Camille is a  currently at 34w6d who today reports the following:  Contractions - No; Leaking - No; Vaginal bleeding -  No; Swelling of extremities - No; Baby is active - YES   She continues to have a nonproductive cough. States it also feels tight in her chest sometimes. By the end of the day, she loses her voice. She doesn't have a hx of asthma. This happened in Sept but resolved for a while.    ROS:   GI:   Nausea - No; Constipation - No   Neuro:  Headache - No; Visual disturbances - No.    EXAM:   Vitals:  See prenatal flowsheet, /66, Wt no change   Abdomen:   See prenatal flowsheet, soft, nontender   Pelvic:  See prenatal flowsheet   Urine:  See prenatal flowsheet              Lungs:             Insp wheezing in bases    MDM:  Impression: Supervision of low risk pregnancy   Tests done today: none   Topics discussed: kick counts and fetal movement  labor signs and symptoms  Albuterol inhaler PRN   Tests next visit: GBS testing   Next visit: 2 weeks     This note was electronically signed.  Subha Arteaga, APRN  2018

## 2018-12-13 ENCOUNTER — ROUTINE PRENATAL (OUTPATIENT)
Dept: OBSTETRICS AND GYNECOLOGY | Facility: CLINIC | Age: 23
End: 2018-12-13

## 2018-12-13 VITALS — DIASTOLIC BLOOD PRESSURE: 66 MMHG | BODY MASS INDEX: 27.88 KG/M2 | WEIGHT: 178 LBS | SYSTOLIC BLOOD PRESSURE: 126 MMHG

## 2018-12-13 DIAGNOSIS — Z36.85 ANTENATAL SCREENING FOR STREPTOCOCCUS B: Primary | ICD-10-CM

## 2018-12-13 DIAGNOSIS — Z34.83 ENCOUNTER FOR SUPERVISION OF OTHER NORMAL PREGNANCY IN THIRD TRIMESTER: ICD-10-CM

## 2018-12-13 PROCEDURE — 0502F SUBSEQUENT PRENATAL CARE: CPT | Performed by: MIDWIFE

## 2018-12-13 NOTE — PROGRESS NOTES
Chief Complaint   Patient presents with   • Routine Prenatal Visit     GBS done today, no complaints        HPI: Camille is a  currently at 35w6d who today reports the following:  Contractions - No; Leaking - No; Vaginal bleeding -  No; Swelling of extremities - No; Baby is active - YES   Inhaler has helped with cough and breathing. Having some mild discomfort lower abdomen, especially when on her feet a lot    ROS:   GI:   Nausea - No; Constipation - No   Neuro:  Headache - No; Visual disturbances - No.      EXAM:   Vitals:  See prenatal flowsheet, /66, Wt -1#   Abdomen:   See prenatal flowsheet as noted and reviewed, soft, nontender   Pelvic:  See prenatal flowsheet   Urine:  See prenatal flowsheet as noted and reviewed    MDM:  Impression: Supervision of low risk pregnancy   Round ligament pain   Tests done today: GBS testing   Topics discussed: kick counts and fetal movement  labor signs and symptoms  maternity belt if desires   Tests next visit: none   Next visit: 1 weeks     This note was electronically signed.  Subha Arteaga, ANTHONY  2018

## 2018-12-15 LAB — GP B STREP DNA SPEC QL NAA+PROBE: NEGATIVE

## 2018-12-21 ENCOUNTER — ROUTINE PRENATAL (OUTPATIENT)
Dept: OBSTETRICS AND GYNECOLOGY | Facility: CLINIC | Age: 23
End: 2018-12-21

## 2018-12-21 VITALS — WEIGHT: 179 LBS | DIASTOLIC BLOOD PRESSURE: 74 MMHG | SYSTOLIC BLOOD PRESSURE: 126 MMHG | BODY MASS INDEX: 28.04 KG/M2

## 2018-12-21 DIAGNOSIS — Z34.93 NORMAL PREGNANCY, THIRD TRIMESTER: Primary | ICD-10-CM

## 2018-12-21 PROCEDURE — 0502F SUBSEQUENT PRENATAL CARE: CPT | Performed by: NURSE PRACTITIONER

## 2018-12-21 NOTE — PROGRESS NOTES
[id number]  Chief Complaint   Patient presents with   • Routine Prenatal Visit     no complaints         HPI  , 37w0d reports good FM.  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  Some Contractions      ROS  /74   Wt 81.2 kg (179 lb)   LMP 2018   BMI 28.04 kg/m²  -See Prenatal Assessment    ROS:      GI: Nausea - No; Constipation - No;    Diarrhea - No    Neuro: Headache - No; Visual change - No      EXAM  General Appearance:  Pleasant  Lungs: Breathing unlabored  Abdomen:  See flow sheet for Fundal ht, FM, FHT's  LE: Neg edema  Option for exam - declined     MDM  Impression:  Problems/Risk Normal Pregnancy     Tests done today: none   Topics discussed: S/S labor and adeq FM/Kick Counts  encouraged questions - call prn    Tests next visit: none     OB History      Para Term  AB Living    2 1 1     1    SAB TAB Ectopic Molar Multiple Live Births              1          Past Medical History:   Diagnosis Date   • Patient denies medical problems    • Rh negative status during pregnancy 2018       Past Surgical History:   Procedure Laterality Date   • NO PAST SURGERIES         Family History   Problem Relation Age of Onset   • Diabetes Mother    • No Known Problems Father    • No Known Problems Daughter    • Hypertension Maternal Uncle    • Hypertension Maternal Grandmother    • Hypertension Maternal Grandfather        Social History     Socioeconomic History   • Marital status:      Spouse name: Not on file   • Number of children: Not on file   • Years of education: Not on file   • Highest education level: Not on file   Social Needs   • Financial resource strain: Not on file   • Food insecurity - worry: Not on file   • Food insecurity - inability: Not on file   • Transportation needs - medical: Not on file   • Transportation needs - non-medical: Not on file   Occupational History   • Not on file   Tobacco Use   • Smoking status: Never Smoker   •  Smokeless tobacco: Never Used   Substance and Sexual Activity   • Alcohol use: No   • Drug use: No   • Sexual activity: Defer     Partners: Male     Birth control/protection: None   Other Topics Concern   • Not on file   Social History Narrative   • Not on file

## 2018-12-28 ENCOUNTER — ROUTINE PRENATAL (OUTPATIENT)
Dept: OBSTETRICS AND GYNECOLOGY | Facility: CLINIC | Age: 23
End: 2018-12-28

## 2018-12-28 VITALS — SYSTOLIC BLOOD PRESSURE: 124 MMHG | DIASTOLIC BLOOD PRESSURE: 70 MMHG | WEIGHT: 182 LBS | BODY MASS INDEX: 28.51 KG/M2

## 2018-12-28 DIAGNOSIS — Z34.93 NORMAL PREGNANCY, THIRD TRIMESTER: Primary | ICD-10-CM

## 2018-12-28 PROCEDURE — 0502F SUBSEQUENT PRENATAL CARE: CPT | Performed by: NURSE PRACTITIONER

## 2018-12-28 NOTE — PROGRESS NOTES
[id number]  Chief Complaint   Patient presents with   • Routine Prenatal Visit     C/O mild cramping        HPI  , 38w0d reports good FM  Some cramping - no bleeding or fluid leaking     ROS  /70   Wt 82.6 kg (182 lb)   LMP 2018   BMI 28.51 kg/m²  -See Prenatal Assessment    ROS:      GI: Nausea - No; Constipation - No;    Diarrhea - No    Neuro: Headache - No; Visual change - No      EXAM  General Appearance:  Pleasant  Lungs: Breathing unlabored  Abdomen:  See flow sheet for Fundal ht, FM, FHT's  LE: Neg edema  V/E  Loose 1/50% -2 cx post cephalic -     MDM  Impression:  Problems/Risk Normal Pregnancy     Tests done today: none   Topics discussed: S/S labor and adeq FM/Kick Counts  encouraged questions - call prn    Tests next visit: none     OB History      Para Term  AB Living    2 1 1     1    SAB TAB Ectopic Molar Multiple Live Births              1          Past Medical History:   Diagnosis Date   • Patient denies medical problems    • Rh negative status during pregnancy 2018       Past Surgical History:   Procedure Laterality Date   • NO PAST SURGERIES         Family History   Problem Relation Age of Onset   • Diabetes Mother    • No Known Problems Father    • No Known Problems Daughter    • Hypertension Maternal Uncle    • Hypertension Maternal Grandmother    • Hypertension Maternal Grandfather        Social History     Socioeconomic History   • Marital status:      Spouse name: Not on file   • Number of children: Not on file   • Years of education: Not on file   • Highest education level: Not on file   Social Needs   • Financial resource strain: Not on file   • Food insecurity - worry: Not on file   • Food insecurity - inability: Not on file   • Transportation needs - medical: Not on file   • Transportation needs - non-medical: Not on file   Occupational History   • Not on file   Tobacco Use   • Smoking status: Never Smoker   • Smokeless tobacco: Never Used    Substance and Sexual Activity   • Alcohol use: No   • Drug use: No   • Sexual activity: Defer     Partners: Male     Birth control/protection: None   Other Topics Concern   • Not on file   Social History Narrative   • Not on file

## 2019-01-02 ENCOUNTER — ROUTINE PRENATAL (OUTPATIENT)
Dept: OBSTETRICS AND GYNECOLOGY | Facility: CLINIC | Age: 24
End: 2019-01-02

## 2019-01-02 VITALS — SYSTOLIC BLOOD PRESSURE: 122 MMHG | WEIGHT: 182 LBS | DIASTOLIC BLOOD PRESSURE: 68 MMHG | BODY MASS INDEX: 28.51 KG/M2

## 2019-01-02 DIAGNOSIS — O26.893 RH NEGATIVE STATUS DURING PREGNANCY IN THIRD TRIMESTER: ICD-10-CM

## 2019-01-02 DIAGNOSIS — Z67.91 RH NEGATIVE STATUS DURING PREGNANCY IN THIRD TRIMESTER: ICD-10-CM

## 2019-01-02 DIAGNOSIS — Z34.93 PRENATAL CARE IN THIRD TRIMESTER: Primary | ICD-10-CM

## 2019-01-02 PROCEDURE — 0502F SUBSEQUENT PRENATAL CARE: CPT | Performed by: OBSTETRICS & GYNECOLOGY

## 2019-01-02 NOTE — PROGRESS NOTES
Chief Complaint   Patient presents with   • Routine Prenatal Visit       HPI:   Camille is a  currently at 38w5d who today reports the following:  Contractions - YES - but less than 4/hour AND no associated change in vaginal discharge; Leaking - No; Vaginal bleeding -  No; Swelling of extremities - No. Good fetal movement - YES.    ROS:  GI: Nausea - No; Constipation - No; Diarrhea - No. RUQ pain - No    Neuro: Headache - No; Visual disturbances - No.    Pertinent items are noted in HPI, all other systems reviewed and negative    Review of History:  The following portions of the patient's history were reviewed and updated as appropriate:problem list, current medications, allergies, past family history, past medical history, past social history and past surgical history.    Current Outpatient Medications on File Prior to Visit   Medication Sig Dispense Refill   • albuterol 108 (90 Base) MCG/ACT inhaler Inhale 2 puffs Every 4 (Four) Hours As Needed for Wheezing. 1 inhaler 0   • Htsspc-AxSqc-Qzzgp-FA-DHA w/oA (VITAPEARL) 30-1.4-200 MG capsule controlled-release Take 1 capsule by mouth Daily. 90 capsule 3     No current facility-administered medications on file prior to visit.        EXAM:  /68   Wt 82.6 kg (182 lb)   LMP 2018   BMI 28.51 kg/m²     Gen: NAD, conversant  Pulm: No use of accessory muscles, normal respirations  Abdomen: Gravid, nontender, size = dates, + fetal cardiac activity  Ext: no edema, no rashes, WWP  Gait: normal for pregnancy  Psych: Mood, insight, judgement intact  SVE: 3/50/-3    Lab Results   Component Value Date    ABO O 2018    RH Negative 2018    ABSCRN Negative 2018       Social History    Tobacco Use      Smoking status: Never Smoker      Smokeless tobacco: Never Used      I have reviewed the prenatal labs and previous ultrasounds today.    MDM:  Diagnosis: Supervision of low risk pregnancy  Rh negative   Tests/Orders/Rx today: No orders of the  defined types were placed in this encounter.    Meds Ordered: none   Topics discussed: induction of labor  kick counts and fetal movement  labor signs and symptoms  PIH precautions   Tests next visit: none   Next visit: none     Travon Garcia MD  Obstetrics and Gynecology  [facility]

## 2019-01-06 ENCOUNTER — PREP FOR SURGERY (OUTPATIENT)
Dept: OTHER | Facility: HOSPITAL | Age: 24
End: 2019-01-06

## 2019-01-06 DIAGNOSIS — Z34.90 ENCOUNTER FOR ELECTIVE INDUCTION OF LABOR: Primary | ICD-10-CM

## 2019-01-06 RX ORDER — HYDROCODONE BITARTRATE AND ACETAMINOPHEN 5; 325 MG/1; MG/1
2 TABLET ORAL EVERY 4 HOURS PRN
Status: CANCELLED | OUTPATIENT
Start: 2019-01-06 | End: 2019-01-16

## 2019-01-06 RX ORDER — ACETAMINOPHEN 325 MG/1
650 TABLET ORAL EVERY 4 HOURS PRN
Status: CANCELLED | OUTPATIENT
Start: 2019-01-06

## 2019-01-06 RX ORDER — MORPHINE SULFATE 4 MG/ML
4 INJECTION, SOLUTION INTRAMUSCULAR; INTRAVENOUS EVERY 4 HOURS PRN
Status: CANCELLED | OUTPATIENT
Start: 2019-01-06 | End: 2019-01-16

## 2019-01-06 RX ORDER — MORPHINE SULFATE 2 MG/ML
2 INJECTION, SOLUTION INTRAMUSCULAR; INTRAVENOUS
Status: CANCELLED | OUTPATIENT
Start: 2019-01-06 | End: 2019-01-16

## 2019-01-06 RX ORDER — PROMETHAZINE HYDROCHLORIDE 25 MG/ML
12.5 INJECTION, SOLUTION INTRAMUSCULAR; INTRAVENOUS EVERY 4 HOURS PRN
Status: CANCELLED | OUTPATIENT
Start: 2019-01-06 | End: 2019-01-07

## 2019-01-06 RX ORDER — PROMETHAZINE HYDROCHLORIDE 25 MG/1
25 SUPPOSITORY RECTAL EVERY 6 HOURS PRN
Status: CANCELLED | OUTPATIENT
Start: 2019-01-06 | End: 2019-01-07

## 2019-01-06 RX ORDER — METHYLERGONOVINE MALEATE 0.2 MG/ML
200 INJECTION INTRAVENOUS ONCE AS NEEDED
Status: CANCELLED | OUTPATIENT
Start: 2019-01-06 | End: 2019-01-07

## 2019-01-06 RX ORDER — LIDOCAINE HYDROCHLORIDE 10 MG/ML
5 INJECTION, SOLUTION EPIDURAL; INFILTRATION; INTRACAUDAL; PERINEURAL AS NEEDED
Status: CANCELLED | OUTPATIENT
Start: 2019-01-06

## 2019-01-06 RX ORDER — SODIUM CHLORIDE 0.9 % (FLUSH) 0.9 %
1-10 SYRINGE (ML) INJECTION AS NEEDED
Status: CANCELLED | OUTPATIENT
Start: 2019-01-06

## 2019-01-06 RX ORDER — ONDANSETRON 4 MG/1
4 TABLET, ORALLY DISINTEGRATING ORAL EVERY 6 HOURS PRN
Status: CANCELLED | OUTPATIENT
Start: 2019-01-06

## 2019-01-06 RX ORDER — MORPHINE SULFATE 2 MG/ML
6 INJECTION, SOLUTION INTRAMUSCULAR; INTRAVENOUS
Status: CANCELLED | OUTPATIENT
Start: 2019-01-06 | End: 2019-01-16

## 2019-01-06 RX ORDER — CALCIUM CARBONATE 200(500)MG
2 TABLET,CHEWABLE ORAL 3 TIMES DAILY PRN
Status: CANCELLED | OUTPATIENT
Start: 2019-01-06

## 2019-01-06 RX ORDER — ONDANSETRON 2 MG/ML
4 INJECTION INTRAMUSCULAR; INTRAVENOUS EVERY 6 HOURS PRN
Status: CANCELLED | OUTPATIENT
Start: 2019-01-06

## 2019-01-06 RX ORDER — ZOLPIDEM TARTRATE 5 MG/1
5 TABLET ORAL ONCE
Status: CANCELLED | OUTPATIENT
Start: 2019-01-07 | End: 2019-01-07

## 2019-01-06 RX ORDER — PROMETHAZINE HYDROCHLORIDE 25 MG/ML
25 INJECTION, SOLUTION INTRAMUSCULAR; INTRAVENOUS EVERY 6 HOURS PRN
Status: CANCELLED | OUTPATIENT
Start: 2019-01-06 | End: 2019-01-07

## 2019-01-06 RX ORDER — SODIUM CHLORIDE 0.9 % (FLUSH) 0.9 %
3 SYRINGE (ML) INJECTION EVERY 12 HOURS SCHEDULED
Status: CANCELLED | OUTPATIENT
Start: 2019-01-07

## 2019-01-06 RX ORDER — MISOPROSTOL 200 UG/1
800 TABLET ORAL AS NEEDED
Status: CANCELLED | OUTPATIENT
Start: 2019-01-06

## 2019-01-06 RX ORDER — ZOLPIDEM TARTRATE 5 MG/1
5 TABLET ORAL NIGHTLY PRN
Status: CANCELLED | OUTPATIENT
Start: 2019-01-06 | End: 2019-01-16

## 2019-01-06 RX ORDER — PROMETHAZINE HYDROCHLORIDE 25 MG/1
25 TABLET ORAL EVERY 6 HOURS PRN
Status: CANCELLED | OUTPATIENT
Start: 2019-01-06 | End: 2019-01-07

## 2019-01-06 RX ORDER — SODIUM CHLORIDE, SODIUM LACTATE, POTASSIUM CHLORIDE, CALCIUM CHLORIDE 600; 310; 30; 20 MG/100ML; MG/100ML; MG/100ML; MG/100ML
125 INJECTION, SOLUTION INTRAVENOUS CONTINUOUS
Status: CANCELLED | OUTPATIENT
Start: 2019-01-07

## 2019-01-06 RX ORDER — CARBOPROST TROMETHAMINE 250 UG/ML
250 INJECTION, SOLUTION INTRAMUSCULAR ONCE AS NEEDED
Status: CANCELLED | OUTPATIENT
Start: 2019-01-06 | End: 2019-01-07

## 2019-01-06 RX ORDER — ONDANSETRON 4 MG/1
4 TABLET, FILM COATED ORAL EVERY 6 HOURS PRN
Status: CANCELLED | OUTPATIENT
Start: 2019-01-06

## 2019-01-07 ENCOUNTER — ANESTHESIA EVENT (OUTPATIENT)
Dept: LABOR AND DELIVERY | Facility: HOSPITAL | Age: 24
End: 2019-01-07

## 2019-01-07 ENCOUNTER — HOSPITAL ENCOUNTER (INPATIENT)
Facility: HOSPITAL | Age: 24
LOS: 2 days | Discharge: HOME | End: 2019-01-09
Attending: OBSTETRICS & GYNECOLOGY | Admitting: MIDWIFE

## 2019-01-07 ENCOUNTER — ANESTHESIA (OUTPATIENT)
Dept: LABOR AND DELIVERY | Facility: HOSPITAL | Age: 24
End: 2019-01-07

## 2019-01-07 DIAGNOSIS — Z34.90 ENCOUNTER FOR ELECTIVE INDUCTION OF LABOR: ICD-10-CM

## 2019-01-07 LAB
ABO GROUP BLD: NORMAL
BASOPHILS # BLD AUTO: 0.03 10*3/MM3 (ref 0–0.2)
BASOPHILS NFR BLD AUTO: 0.3 % (ref 0–2.5)
BILIRUB BLD-MCNC: NEGATIVE MG/DL
BLD GP AB SCN SERPL QL: NEGATIVE
CLARITY, POC: CLEAR
COLOR UR: YELLOW
DEPRECATED RDW RBC AUTO: 40.3 FL (ref 37–54)
EOSINOPHIL # BLD AUTO: 0.19 10*3/MM3 (ref 0–0.7)
EOSINOPHIL NFR BLD AUTO: 1.8 % (ref 0–7)
ERYTHROCYTE [DISTWIDTH] IN BLOOD BY AUTOMATED COUNT: 11.8 % (ref 11.5–14.5)
GLUCOSE UR STRIP-MCNC: NEGATIVE MG/DL
HCT VFR BLD AUTO: 33.2 % (ref 37–47)
HGB BLD-MCNC: 11.6 G/DL (ref 12–16)
IMM GRANULOCYTES # BLD AUTO: 0.05 10*3/MM3 (ref 0–0.06)
IMM GRANULOCYTES NFR BLD AUTO: 0.5 % (ref 0–0.6)
KETONES UR QL: ABNORMAL
LEUKOCYTE EST, POC: ABNORMAL
LYMPHOCYTES # BLD AUTO: 2.17 10*3/MM3 (ref 0.6–3.4)
LYMPHOCYTES NFR BLD AUTO: 20.9 % (ref 10–50)
MCH RBC QN AUTO: 32.8 PG (ref 27–31)
MCHC RBC AUTO-ENTMCNC: 34.9 G/DL (ref 30–37)
MCV RBC AUTO: 93.8 FL (ref 81–99)
MONOCYTES # BLD AUTO: 0.75 10*3/MM3 (ref 0–0.9)
MONOCYTES NFR BLD AUTO: 7.2 % (ref 0–12)
NEUTROPHILS # BLD AUTO: 7.19 10*3/MM3 (ref 2–6.9)
NEUTROPHILS NFR BLD AUTO: 69.3 % (ref 37–80)
NITRITE UR-MCNC: NEGATIVE MG/ML
NRBC BLD AUTO-RTO: 0 /100 WBC (ref 0–0)
PH UR: 6 [PH] (ref 5–8)
PLATELET # BLD AUTO: 167 10*3/MM3 (ref 130–400)
PMV BLD AUTO: 11.5 FL (ref 6–12)
PROT UR STRIP-MCNC: ABNORMAL MG/DL
RBC # BLD AUTO: 3.54 10*6/MM3 (ref 4.2–5.4)
RBC # UR STRIP: NEGATIVE /UL
RH BLD: NEGATIVE
SP GR UR: 1.01 (ref 1–1.03)
T&S EXPIRATION DATE: NORMAL
UROBILINOGEN UR QL: NORMAL
WBC NRBC COR # BLD: 10.38 10*3/MM3 (ref 4.8–10.8)

## 2019-01-07 PROCEDURE — 51703 INSERT BLADDER CATH COMPLEX: CPT

## 2019-01-07 PROCEDURE — 25010000002 FENTANYL CITRATE (PF) 250 MCG/5ML SOLUTION 5 ML VIAL: Performed by: NURSE ANESTHETIST, CERTIFIED REGISTERED

## 2019-01-07 PROCEDURE — 25010000002 ROPIVACAINE PER 1 MG: Performed by: NURSE ANESTHETIST, CERTIFIED REGISTERED

## 2019-01-07 PROCEDURE — 59400 OBSTETRICAL CARE: CPT | Performed by: MIDWIFE

## 2019-01-07 PROCEDURE — 86850 RBC ANTIBODY SCREEN: CPT | Performed by: OBSTETRICS & GYNECOLOGY

## 2019-01-07 PROCEDURE — G0463 HOSPITAL OUTPT CLINIC VISIT: HCPCS

## 2019-01-07 PROCEDURE — 86900 BLOOD TYPING SEROLOGIC ABO: CPT | Performed by: OBSTETRICS & GYNECOLOGY

## 2019-01-07 PROCEDURE — 86901 BLOOD TYPING SEROLOGIC RH(D): CPT | Performed by: OBSTETRICS & GYNECOLOGY

## 2019-01-07 PROCEDURE — 59025 FETAL NON-STRESS TEST: CPT

## 2019-01-07 PROCEDURE — 3E033VJ INTRODUCTION OF OTHER HORMONE INTO PERIPHERAL VEIN, PERCUTANEOUS APPROACH: ICD-10-PCS | Performed by: MIDWIFE

## 2019-01-07 PROCEDURE — 25010000002 FENTANYL CITRATE (PF) 100 MCG/2ML SOLUTION: Performed by: NURSE ANESTHETIST, CERTIFIED REGISTERED

## 2019-01-07 PROCEDURE — 85025 COMPLETE CBC W/AUTO DIFF WBC: CPT | Performed by: OBSTETRICS & GYNECOLOGY

## 2019-01-07 PROCEDURE — C1755 CATHETER, INTRASPINAL: HCPCS | Performed by: NURSE ANESTHETIST, CERTIFIED REGISTERED

## 2019-01-07 PROCEDURE — 81002 URINALYSIS NONAUTO W/O SCOPE: CPT | Performed by: OBSTETRICS & GYNECOLOGY

## 2019-01-07 RX ORDER — FENTANYL CITRATE 50 UG/ML
INJECTION, SOLUTION INTRAMUSCULAR; INTRAVENOUS AS NEEDED
Status: DISCONTINUED | OUTPATIENT
Start: 2019-01-07 | End: 2019-02-07 | Stop reason: SURG

## 2019-01-07 RX ORDER — ONDANSETRON 2 MG/ML
4 INJECTION INTRAMUSCULAR; INTRAVENOUS EVERY 6 HOURS PRN
Status: DISCONTINUED | OUTPATIENT
Start: 2019-01-07 | End: 2019-01-09 | Stop reason: HOSPADM

## 2019-01-07 RX ORDER — FENTANYL CITRATE 50 UG/ML
INJECTION, SOLUTION INTRAMUSCULAR; INTRAVENOUS
Status: COMPLETED
Start: 2019-01-07 | End: 2019-01-07

## 2019-01-07 RX ORDER — LANOLIN
CREAM (GRAM) TOPICAL
Status: DISCONTINUED | OUTPATIENT
Start: 2019-01-07 | End: 2019-01-09 | Stop reason: HOSPADM

## 2019-01-07 RX ORDER — HYDROCODONE BITARTRATE AND ACETAMINOPHEN 5; 325 MG/1; MG/1
2 TABLET ORAL EVERY 4 HOURS PRN
Status: DISCONTINUED | OUTPATIENT
Start: 2019-01-07 | End: 2019-01-07 | Stop reason: HOSPADM

## 2019-01-07 RX ORDER — ACETAMINOPHEN 325 MG/1
650 TABLET ORAL EVERY 4 HOURS PRN
Status: DISCONTINUED | OUTPATIENT
Start: 2019-01-07 | End: 2019-01-07 | Stop reason: HOSPADM

## 2019-01-07 RX ORDER — SODIUM CHLORIDE 0.9 % (FLUSH) 0.9 %
1-10 SYRINGE (ML) INJECTION AS NEEDED
Status: DISCONTINUED | OUTPATIENT
Start: 2019-01-07 | End: 2019-01-07 | Stop reason: HOSPADM

## 2019-01-07 RX ORDER — IBUPROFEN 600 MG/1
600 TABLET ORAL EVERY 6 HOURS PRN
Status: DISCONTINUED | OUTPATIENT
Start: 2019-01-07 | End: 2019-01-09 | Stop reason: HOSPADM

## 2019-01-07 RX ORDER — PRENATAL VIT/IRON FUM/FOLIC AC 27MG-0.8MG
1 TABLET ORAL DAILY
Status: DISCONTINUED | OUTPATIENT
Start: 2019-01-07 | End: 2019-01-09 | Stop reason: HOSPADM

## 2019-01-07 RX ORDER — HYDROCODONE BITARTRATE AND ACETAMINOPHEN 5; 325 MG/1; MG/1
1 TABLET ORAL EVERY 6 HOURS PRN
Status: DISCONTINUED | OUTPATIENT
Start: 2019-01-07 | End: 2019-01-09 | Stop reason: HOSPADM

## 2019-01-07 RX ORDER — DIPHENHYDRAMINE HYDROCHLORIDE 50 MG/ML
12.5 INJECTION INTRAMUSCULAR; INTRAVENOUS EVERY 8 HOURS PRN
Status: DISCONTINUED | OUTPATIENT
Start: 2019-01-07 | End: 2019-01-07 | Stop reason: HOSPADM

## 2019-01-07 RX ORDER — OXYTOCIN IN DEXTROSE 5 % IN LR 20/1000 ML
1000 PLASTIC BAG, INJECTION (ML) INTRAVENOUS CONTINUOUS
Status: DISCONTINUED | OUTPATIENT
Start: 2019-01-07 | End: 2019-01-07 | Stop reason: RX

## 2019-01-07 RX ORDER — PROMETHAZINE HYDROCHLORIDE 25 MG/ML
12.5 INJECTION, SOLUTION INTRAMUSCULAR; INTRAVENOUS EVERY 6 HOURS PRN
Status: DISCONTINUED | OUTPATIENT
Start: 2019-01-07 | End: 2019-01-09 | Stop reason: HOSPADM

## 2019-01-07 RX ORDER — ONDANSETRON 4 MG/1
4 TABLET, ORALLY DISINTEGRATING ORAL EVERY 6 HOURS PRN
Status: DISCONTINUED | OUTPATIENT
Start: 2019-01-07 | End: 2019-01-07 | Stop reason: HOSPADM

## 2019-01-07 RX ORDER — EPHEDRINE SULFATE 50 MG/ML
5 INJECTION, SOLUTION INTRAVENOUS
Status: DISCONTINUED | OUTPATIENT
Start: 2019-01-07 | End: 2019-01-07 | Stop reason: HOSPADM

## 2019-01-07 RX ORDER — ONDANSETRON 4 MG/1
4 TABLET, FILM COATED ORAL EVERY 8 HOURS PRN
Status: DISCONTINUED | OUTPATIENT
Start: 2019-01-07 | End: 2019-01-09 | Stop reason: HOSPADM

## 2019-01-07 RX ORDER — CALCIUM CARBONATE 200(500)MG
2 TABLET,CHEWABLE ORAL 3 TIMES DAILY PRN
Status: DISCONTINUED | OUTPATIENT
Start: 2019-01-07 | End: 2019-01-07 | Stop reason: HOSPADM

## 2019-01-07 RX ORDER — PROMETHAZINE HYDROCHLORIDE 25 MG/ML
12.5 INJECTION, SOLUTION INTRAMUSCULAR; INTRAVENOUS EVERY 4 HOURS PRN
Status: DISCONTINUED | OUTPATIENT
Start: 2019-01-07 | End: 2019-01-07 | Stop reason: HOSPADM

## 2019-01-07 RX ORDER — SODIUM CHLORIDE 0.9 % (FLUSH) 0.9 %
3 SYRINGE (ML) INJECTION EVERY 12 HOURS SCHEDULED
Status: DISCONTINUED | OUTPATIENT
Start: 2019-01-07 | End: 2019-01-07 | Stop reason: HOSPADM

## 2019-01-07 RX ORDER — SODIUM CHLORIDE 0.9 % (FLUSH) 0.9 %
1-10 SYRINGE (ML) INJECTION AS NEEDED
Status: DISCONTINUED | OUTPATIENT
Start: 2019-01-07 | End: 2019-01-09 | Stop reason: HOSPADM

## 2019-01-07 RX ORDER — ONDANSETRON 2 MG/ML
4 INJECTION INTRAMUSCULAR; INTRAVENOUS EVERY 6 HOURS PRN
Status: DISCONTINUED | OUTPATIENT
Start: 2019-01-07 | End: 2019-01-07 | Stop reason: HOSPADM

## 2019-01-07 RX ORDER — PROMETHAZINE HYDROCHLORIDE 25 MG/1
25 TABLET ORAL EVERY 6 HOURS PRN
Status: DISCONTINUED | OUTPATIENT
Start: 2019-01-07 | End: 2019-01-07 | Stop reason: HOSPADM

## 2019-01-07 RX ORDER — MORPHINE SULFATE 2 MG/ML
4 INJECTION, SOLUTION INTRAMUSCULAR; INTRAVENOUS EVERY 4 HOURS PRN
Status: DISCONTINUED | OUTPATIENT
Start: 2019-01-07 | End: 2019-01-07 | Stop reason: HOSPADM

## 2019-01-07 RX ORDER — MORPHINE SULFATE 2 MG/ML
2 INJECTION, SOLUTION INTRAMUSCULAR; INTRAVENOUS
Status: DISCONTINUED | OUTPATIENT
Start: 2019-01-07 | End: 2019-01-07 | Stop reason: HOSPADM

## 2019-01-07 RX ORDER — LIDOCAINE HYDROCHLORIDE 10 MG/ML
5 INJECTION, SOLUTION EPIDURAL; INFILTRATION; INTRACAUDAL; PERINEURAL AS NEEDED
Status: DISCONTINUED | OUTPATIENT
Start: 2019-01-07 | End: 2019-01-07 | Stop reason: HOSPADM

## 2019-01-07 RX ORDER — MORPHINE SULFATE 2 MG/ML
4 INJECTION, SOLUTION INTRAMUSCULAR; INTRAVENOUS EVERY 4 HOURS PRN
Status: DISCONTINUED | OUTPATIENT
Start: 2019-01-07 | End: 2019-01-07 | Stop reason: SDUPTHER

## 2019-01-07 RX ORDER — CARBOPROST TROMETHAMINE 250 UG/ML
250 INJECTION, SOLUTION INTRAMUSCULAR ONCE AS NEEDED
Status: DISCONTINUED | OUTPATIENT
Start: 2019-01-07 | End: 2019-01-07 | Stop reason: HOSPADM

## 2019-01-07 RX ORDER — SODIUM CHLORIDE, SODIUM LACTATE, POTASSIUM CHLORIDE, CALCIUM CHLORIDE 600; 310; 30; 20 MG/100ML; MG/100ML; MG/100ML; MG/100ML
125 INJECTION, SOLUTION INTRAVENOUS CONTINUOUS
Status: DISCONTINUED | OUTPATIENT
Start: 2019-01-07 | End: 2019-01-07

## 2019-01-07 RX ORDER — BISACODYL 10 MG
10 SUPPOSITORY, RECTAL RECTAL DAILY PRN
Status: DISCONTINUED | OUTPATIENT
Start: 2019-01-08 | End: 2019-01-09 | Stop reason: HOSPADM

## 2019-01-07 RX ORDER — PROMETHAZINE HYDROCHLORIDE 25 MG/ML
25 INJECTION, SOLUTION INTRAMUSCULAR; INTRAVENOUS EVERY 6 HOURS PRN
Status: DISCONTINUED | OUTPATIENT
Start: 2019-01-07 | End: 2019-01-07 | Stop reason: HOSPADM

## 2019-01-07 RX ORDER — MISOPROSTOL 200 UG/1
800 TABLET ORAL AS NEEDED
Status: DISCONTINUED | OUTPATIENT
Start: 2019-01-07 | End: 2019-01-07 | Stop reason: HOSPADM

## 2019-01-07 RX ORDER — MORPHINE SULFATE 2 MG/ML
6 INJECTION, SOLUTION INTRAMUSCULAR; INTRAVENOUS
Status: DISCONTINUED | OUTPATIENT
Start: 2019-01-07 | End: 2019-01-07 | Stop reason: SDUPTHER

## 2019-01-07 RX ORDER — ZOLPIDEM TARTRATE 5 MG/1
5 TABLET ORAL NIGHTLY PRN
Status: DISCONTINUED | OUTPATIENT
Start: 2019-01-07 | End: 2019-01-09 | Stop reason: HOSPADM

## 2019-01-07 RX ORDER — PROMETHAZINE HYDROCHLORIDE 25 MG/1
25 SUPPOSITORY RECTAL EVERY 6 HOURS PRN
Status: DISCONTINUED | OUTPATIENT
Start: 2019-01-07 | End: 2019-01-07 | Stop reason: HOSPADM

## 2019-01-07 RX ORDER — ACETAMINOPHEN 325 MG/1
650 TABLET ORAL EVERY 4 HOURS PRN
Status: DISCONTINUED | OUTPATIENT
Start: 2019-01-07 | End: 2019-01-09 | Stop reason: HOSPADM

## 2019-01-07 RX ORDER — METHYLERGONOVINE MALEATE 0.2 MG/ML
200 INJECTION INTRAVENOUS ONCE AS NEEDED
Status: DISCONTINUED | OUTPATIENT
Start: 2019-01-07 | End: 2019-01-07 | Stop reason: HOSPADM

## 2019-01-07 RX ORDER — PROMETHAZINE HYDROCHLORIDE 25 MG/1
25 TABLET ORAL EVERY 6 HOURS PRN
Status: DISCONTINUED | OUTPATIENT
Start: 2019-01-07 | End: 2019-01-09 | Stop reason: HOSPADM

## 2019-01-07 RX ORDER — ZOLPIDEM TARTRATE 5 MG/1
5 TABLET ORAL NIGHTLY PRN
Status: DISCONTINUED | OUTPATIENT
Start: 2019-01-07 | End: 2019-01-07 | Stop reason: HOSPADM

## 2019-01-07 RX ORDER — PROMETHAZINE HYDROCHLORIDE 12.5 MG/1
12.5 SUPPOSITORY RECTAL EVERY 6 HOURS PRN
Status: DISCONTINUED | OUTPATIENT
Start: 2019-01-07 | End: 2019-01-09 | Stop reason: HOSPADM

## 2019-01-07 RX ORDER — ZOLPIDEM TARTRATE 5 MG/1
5 TABLET ORAL ONCE
Status: DISCONTINUED | OUTPATIENT
Start: 2019-01-07 | End: 2019-01-07 | Stop reason: SDUPTHER

## 2019-01-07 RX ORDER — ONDANSETRON 4 MG/1
4 TABLET, FILM COATED ORAL EVERY 6 HOURS PRN
Status: DISCONTINUED | OUTPATIENT
Start: 2019-01-07 | End: 2019-01-07 | Stop reason: HOSPADM

## 2019-01-07 RX ORDER — DOCUSATE SODIUM 100 MG/1
100 CAPSULE, LIQUID FILLED ORAL 2 TIMES DAILY PRN
Status: DISCONTINUED | OUTPATIENT
Start: 2019-01-07 | End: 2019-01-09 | Stop reason: HOSPADM

## 2019-01-07 RX ADMIN — SODIUM CHLORIDE, POTASSIUM CHLORIDE, SODIUM LACTATE AND CALCIUM CHLORIDE 999 ML/HR: 600; 310; 30; 20 INJECTION, SOLUTION INTRAVENOUS at 08:55

## 2019-01-07 RX ADMIN — ROPIVACAINE HYDROCHLORIDE 12 ML/HR: 2 INJECTION, SOLUTION EPIDURAL; INFILTRATION at 09:43

## 2019-01-07 RX ADMIN — Medication 7 APPLICATION: at 18:14

## 2019-01-07 RX ADMIN — ACETAMINOPHEN 650 MG: 325 TABLET, FILM COATED ORAL at 22:25

## 2019-01-07 RX ADMIN — FENTANYL CITRATE 100 MCG: 50 INJECTION, SOLUTION INTRAMUSCULAR; INTRAVENOUS at 09:42

## 2019-01-07 RX ADMIN — Medication 2 MILLI-UNITS/MIN: at 07:18

## 2019-01-07 RX ADMIN — Medication 333 MILLI-UNITS/MIN: at 12:20

## 2019-01-07 RX ADMIN — SODIUM CHLORIDE, POTASSIUM CHLORIDE, SODIUM LACTATE AND CALCIUM CHLORIDE 1000 ML: 600; 310; 30; 20 INJECTION, SOLUTION INTRAVENOUS at 06:15

## 2019-01-07 RX ADMIN — IBUPROFEN 600 MG: 600 TABLET ORAL at 18:14

## 2019-01-07 RX ADMIN — BENZOCAINE AND LEVOMENTHOL: 200; 5 SPRAY TOPICAL at 18:14

## 2019-01-08 LAB
ABO GROUP BLD: NORMAL
BASOPHILS # BLD AUTO: 0.02 10*3/MM3 (ref 0–0.2)
BASOPHILS NFR BLD AUTO: 0.2 % (ref 0–2.5)
DEPRECATED RDW RBC AUTO: 40.8 FL (ref 37–54)
EOSINOPHIL # BLD AUTO: 0.18 10*3/MM3 (ref 0–0.7)
EOSINOPHIL NFR BLD AUTO: 2.1 % (ref 0–7)
ERYTHROCYTE [DISTWIDTH] IN BLOOD BY AUTOMATED COUNT: 11.9 % (ref 11.5–14.5)
FETAL BLEED: NEGATIVE
HCT VFR BLD AUTO: 29.5 % (ref 37–47)
HGB BLD-MCNC: 10.1 G/DL (ref 12–16)
IMM GRANULOCYTES # BLD AUTO: 0.04 10*3/MM3 (ref 0–0.06)
IMM GRANULOCYTES NFR BLD AUTO: 0.5 % (ref 0–0.6)
LYMPHOCYTES # BLD AUTO: 1.53 10*3/MM3 (ref 0.6–3.4)
LYMPHOCYTES NFR BLD AUTO: 17.6 % (ref 10–50)
MCH RBC QN AUTO: 32.3 PG (ref 27–31)
MCHC RBC AUTO-ENTMCNC: 34.2 G/DL (ref 30–37)
MCV RBC AUTO: 94.2 FL (ref 81–99)
MONOCYTES # BLD AUTO: 0.27 10*3/MM3 (ref 0–0.9)
MONOCYTES NFR BLD AUTO: 3.1 % (ref 0–12)
NEUTROPHILS # BLD AUTO: 6.65 10*3/MM3 (ref 2–6.9)
NEUTROPHILS NFR BLD AUTO: 76.5 % (ref 37–80)
NRBC BLD AUTO-RTO: 0 /100 WBC (ref 0–0)
NUMBER OF DOSES: NORMAL
PLATELET # BLD AUTO: 143 10*3/MM3 (ref 130–400)
PMV BLD AUTO: 11.8 FL (ref 6–12)
RBC # BLD AUTO: 3.13 10*6/MM3 (ref 4.2–5.4)
RH BLD: NEGATIVE
WBC NRBC COR # BLD: 8.69 10*3/MM3 (ref 4.8–10.8)

## 2019-01-08 PROCEDURE — 86901 BLOOD TYPING SEROLOGIC RH(D): CPT | Performed by: OBSTETRICS & GYNECOLOGY

## 2019-01-08 PROCEDURE — 85025 COMPLETE CBC W/AUTO DIFF WBC: CPT | Performed by: MIDWIFE

## 2019-01-08 PROCEDURE — 99024 POSTOP FOLLOW-UP VISIT: CPT | Performed by: NURSE PRACTITIONER

## 2019-01-08 PROCEDURE — 25010000002 RHO D IMMUNE GLOBULIN 1500 UNIT/2ML SOLUTION PREFILLED SYRINGE: Performed by: OBSTETRICS & GYNECOLOGY

## 2019-01-08 PROCEDURE — 86900 BLOOD TYPING SEROLOGIC ABO: CPT | Performed by: OBSTETRICS & GYNECOLOGY

## 2019-01-08 PROCEDURE — 85461 HEMOGLOBIN FETAL: CPT | Performed by: OBSTETRICS & GYNECOLOGY

## 2019-01-08 RX ADMIN — HUMAN RHO(D) IMMUNE GLOBULIN 1500 UNITS: 1500 SOLUTION INTRAMUSCULAR; INTRAVENOUS at 15:44

## 2019-01-08 RX ADMIN — IBUPROFEN 600 MG: 600 TABLET ORAL at 16:08

## 2019-01-08 RX ADMIN — IBUPROFEN 600 MG: 600 TABLET ORAL at 08:38

## 2019-01-08 RX ADMIN — PRENATAL VIT W/ FE FUMARATE-FA TAB 27-0.8 MG 1 TABLET: 27-0.8 TAB at 08:38

## 2019-01-08 RX ADMIN — HYDROCODONE BITARTRATE AND ACETAMINOPHEN 1 TABLET: 5; 325 TABLET ORAL at 02:04

## 2019-01-09 VITALS
OXYGEN SATURATION: 97 % | RESPIRATION RATE: 18 BRPM | HEART RATE: 77 BPM | TEMPERATURE: 97.9 F | SYSTOLIC BLOOD PRESSURE: 122 MMHG | DIASTOLIC BLOOD PRESSURE: 76 MMHG

## 2019-01-09 RX ORDER — FERROUS SULFATE TAB EC 324 MG (65 MG FE EQUIVALENT) 324 (65 FE) MG
324 TABLET DELAYED RESPONSE ORAL 2 TIMES DAILY WITH MEALS
Qty: 60 TABLET | Refills: 1 | Status: SHIPPED | OUTPATIENT
Start: 2019-01-09 | End: 2020-05-20

## 2019-01-09 RX ORDER — IBUPROFEN 600 MG/1
600 TABLET ORAL EVERY 6 HOURS PRN
Qty: 60 TABLET | Refills: 0 | Status: SHIPPED | OUTPATIENT
Start: 2019-01-09 | End: 2020-05-20

## 2019-01-09 RX ADMIN — IBUPROFEN 600 MG: 600 TABLET ORAL at 05:32

## 2019-01-09 RX ADMIN — PRENATAL VIT W/ FE FUMARATE-FA TAB 27-0.8 MG 1 TABLET: 27-0.8 TAB at 08:18

## 2020-05-20 ENCOUNTER — INITIAL PRENATAL (OUTPATIENT)
Dept: OBSTETRICS AND GYNECOLOGY | Facility: CLINIC | Age: 25
End: 2020-05-20

## 2020-05-20 VITALS — WEIGHT: 151 LBS | SYSTOLIC BLOOD PRESSURE: 116 MMHG | BODY MASS INDEX: 23.65 KG/M2 | DIASTOLIC BLOOD PRESSURE: 70 MMHG

## 2020-05-20 DIAGNOSIS — O36.80X0 ENCOUNTER TO DETERMINE FETAL VIABILITY OF PREGNANCY, SINGLE OR UNSPECIFIED FETUS: Primary | ICD-10-CM

## 2020-05-20 PROCEDURE — 0501F PRENATAL FLOW SHEET: CPT | Performed by: OBSTETRICS & GYNECOLOGY

## 2020-05-20 RX ORDER — PNV NO.95/FERROUS FUM/FOLIC AC 28MG-0.8MG
1 TABLET ORAL DAILY
Qty: 90 TABLET | Refills: 9 | Status: SHIPPED | OUTPATIENT
Start: 2020-05-20 | End: 2020-12-15 | Stop reason: HOSPADM

## 2020-05-20 NOTE — PROGRESS NOTES
Subjective   Chief Complaint   Patient presents with   • Initial Prenatal Visit     New OB, LMP 3/15/2020 , Pt is due for pap, pt is concerned of Migraines, has strong history of migraines during her last pregnancy     Camille Still is a 25 y.o. year old .  No LMP recorded. Patient is pregnant.  She presents to be seen because of early IUP.  Ultrasound as noted below is consistent with dates 9 weeks and 6 days with positive cardiac activity.  Patient 2 prior vaginal deliveries at term proven to 8 pounds.  No preeclampsia or gestational diabetes/issues with these pregnancies..     OTHER COMPLAINTS:  Nothing else    The following portions of the patient's history were reviewed and updated as appropriate:  She  has a past medical history of Migraine, Patient denies medical problems, and Rh negative status during pregnancy (2018).  She does not have any pertinent problems on file.  She  has a past surgical history that includes No past surgeries.  Her family history includes Diabetes in her mother; Hypertension in her maternal grandfather, maternal grandmother, and maternal uncle; No Known Problems in her daughter and father.  She  reports that she has never smoked. She has never used smokeless tobacco. She reports that she does not drink alcohol or use drugs.  Current Outpatient Medications   Medication Sig Dispense Refill   • Kguxlx-EdTpe-Gagna-FA-DHA w/oA (VITAPEARL) 30-1.4-200 MG capsule controlled-release Take 1 capsule by mouth Daily. 90 capsule 3     No current facility-administered medications for this visit.      Current Outpatient Medications on File Prior to Visit   Medication Sig   • Whmynh-VfNyd-Feyin-FA-DHA w/oA (VITAPEARL) 30-1.4-200 MG capsule controlled-release Take 1 capsule by mouth Daily.   • [DISCONTINUED] albuterol 108 (90 Base) MCG/ACT inhaler Inhale 2 puffs Every 4 (Four) Hours As Needed for Wheezing.   • [DISCONTINUED] ferrous sulfate 324 (65 Fe) MG tablet delayed-release  EC tablet Take 1 tablet by mouth 2 (Two) Times a Day With Meals.   • [DISCONTINUED] ibuprofen (ADVIL,MOTRIN) 600 MG tablet Take 1 tablet by mouth Every 6 (Six) Hours As Needed for Mild Pain  or Moderate Pain .     No current facility-administered medications on file prior to visit.      She has No Known Allergies.    Social History    Tobacco Use      Smoking status: Never Smoker      Smokeless tobacco: Never Used    Review of Systems  Consitutional POS: nothing reported    NEG: anorexia or night sweats   Gastointestinal POS: nothing reported    NEG: bloating, change in bowel habits, melena or reflux symptoms   Genitourinary POS: nothing reported    NEG: dysuria or hematuria   Integument POS: nothing reported    NEG: moles that are changing in size, shape, color or rashes   Breast POS: nothing reported    NEG: persistent breast lump, skin dimpling or nipple discharge         Respiratory: negative  Cardiovascular: negative          Objective   /70   Wt 68.5 kg (151 lb)   BMI 23.65 kg/m²     General:  well developed; well nourished  no acute distress   Skin:  No suspicious lesions seen   Thyroid: normal to inspection and palpation   Lungs:  breathing is unlabored  clear to auscultation bilaterally   Heart:  regular rate and rhythm, S1, S2 normal, no murmur, click, rub or gallop   Breasts:  Examined in supine position  Symmetric without masses or skin dimpling  Nipples normal without inversion, lesions or discharge  There are no palpable axillary nodes   Abdomen: soft, non-tender; no masses  no umbilical or inguinal hernias are present  no hepato-splenomegaly   Pelvis: Clinical staff was present for exam  External genitalia:  normal appearance of the external genitalia including Bartholin's and [place]'s glands.  :  urethral meatus normal;  Vaginal:  normal pink mucosa without prolapse or lesions.  Cervix:  normal appearance.  Uterus:  normal size, shape and consistency.  Adnexa:  normal bimanual exam of the  adnexa.  Rectal:  digital rectal exam not performed; anus visually normal appearing.     Psychiatric: Alert and oriented ×3, mood and affect appropriate  HEENT: Atraumatic, normocephalic, normal scleral icterus  Extremities: 2+ pulses bilaterally, no edema      Lab Review   No data reviewed    Imaging   Pelvic ultrasound images independantly reviewed - CRL 9w6d, +CA, small 1.7cm JOHANN, normal ovaries, no mass, no FF        Assessment   1. IUP @9+ weeks  2.      Plan   1. Labs today  2. KS through first TM, precatuoins given 2 spotting episdoes last week  3. PAP and cultures done  4. PNV sent in    No orders of the defined types were placed in this encounter.         This note was electronically signed.      May 20, 2020

## 2020-05-21 LAB
ABO GROUP BLD: ABNORMAL
BASOPHILS # BLD AUTO: 0 X10E3/UL (ref 0–0.2)
BASOPHILS NFR BLD AUTO: 0 %
BLD GP AB SCN SERPL QL: NEGATIVE
EOSINOPHIL # BLD AUTO: 0.2 X10E3/UL (ref 0–0.4)
EOSINOPHIL NFR BLD AUTO: 2 %
ERYTHROCYTE [DISTWIDTH] IN BLOOD BY AUTOMATED COUNT: 12.2 % (ref 11.7–15.4)
HBV SURFACE AG SERPL QL IA: NEGATIVE
HCT VFR BLD AUTO: 36 % (ref 34–46.6)
HCV AB S/CO SERPL IA: <0.1 S/CO RATIO (ref 0–0.9)
HGB BLD-MCNC: 12.9 G/DL (ref 11.1–15.9)
HIV 1+2 AB+HIV1 P24 AG SERPL QL IA: NON REACTIVE
IMM GRANULOCYTES # BLD AUTO: 0 X10E3/UL (ref 0–0.1)
IMM GRANULOCYTES NFR BLD AUTO: 0 %
LYMPHOCYTES # BLD AUTO: 2.4 X10E3/UL (ref 0.7–3.1)
LYMPHOCYTES NFR BLD AUTO: 28 %
MCH RBC QN AUTO: 31.4 PG (ref 26.6–33)
MCHC RBC AUTO-ENTMCNC: 35.8 G/DL (ref 31.5–35.7)
MCV RBC AUTO: 88 FL (ref 79–97)
MONOCYTES # BLD AUTO: 0.5 X10E3/UL (ref 0.1–0.9)
MONOCYTES NFR BLD AUTO: 5 %
NEUTROPHILS # BLD AUTO: 5.5 X10E3/UL (ref 1.4–7)
NEUTROPHILS NFR BLD AUTO: 65 %
PLATELET # BLD AUTO: 218 X10E3/UL (ref 150–450)
RBC # BLD AUTO: 4.11 X10E6/UL (ref 3.77–5.28)
RH BLD: NEGATIVE
RPR SER QL: NON REACTIVE
RUBV IGG SERPL IA-ACNC: 2.03 INDEX
WBC # BLD AUTO: 8.5 X10E3/UL (ref 3.4–10.8)

## 2020-06-03 DIAGNOSIS — O36.80X0 ENCOUNTER TO DETERMINE FETAL VIABILITY OF PREGNANCY, SINGLE OR UNSPECIFIED FETUS: ICD-10-CM

## 2020-06-15 ENCOUNTER — ROUTINE PRENATAL (OUTPATIENT)
Dept: OBSTETRICS AND GYNECOLOGY | Facility: CLINIC | Age: 25
End: 2020-06-15

## 2020-06-15 VITALS — BODY MASS INDEX: 24.75 KG/M2 | DIASTOLIC BLOOD PRESSURE: 60 MMHG | SYSTOLIC BLOOD PRESSURE: 120 MMHG | WEIGHT: 158 LBS

## 2020-06-15 DIAGNOSIS — Z34.92 NORMAL PREGNANCY, SECOND TRIMESTER: Primary | ICD-10-CM

## 2020-06-15 PROCEDURE — 0502F SUBSEQUENT PRENATAL CARE: CPT | Performed by: NURSE PRACTITIONER

## 2020-06-15 NOTE — PROGRESS NOTES
[id number]  Chief Complaint   Patient presents with   • Routine Prenatal Visit     No complaints.         HPI  Camille is a  currently at 13w4d who today reports the following:    No c/o - doing well       Contractions - No;   Leaking - No;   Vaginal bleeding -  No;   Swelling of extremities - No.       ROS:        GI: Nausea - No;   Constipation - No;   Diarrhea - No    Neuro: Headache - No;   Visual change - No    Pertinent items are noted in HPI, all other systems reviewed and negative     Social History     Socioeconomic History   • Marital status:      Spouse name: Not on file   • Number of children: Not on file   • Years of education: Not on file   • Highest education level: Not on file   Tobacco Use   • Smoking status: Never Smoker   • Smokeless tobacco: Never Used   Substance and Sexual Activity   • Alcohol use: No   • Drug use: No   • Sexual activity: Defer     Partners: Male     Birth control/protection: None          EXAM  /60   Wt 71.7 kg (158 lb)   BMI 24.75 kg/m²  -See Prenatal Assessment  General Appearance:  Pleasant  Lungs: Breathing unlabored  Abdomen:  See flow sheet for Fundal ht, FM, FHT's  LE: Neg edema  V/E: Not performed     Lab Results   Component Value Date    ABO O 2020    RH Negative 2020    ABSCRN Negative 2020       MDM  Impression: Supervision of normal pregnancy   RH neg    Tests done today: NIPS - discussed & optional  Rhogam at 28 wks    Topics discussed: Nutririon reviewed  encouraged questions - call prn    Tests next visit: U/S          Current Outpatient Medications:   •  Tmhkap-DvFsh-Qsrtg-FA-DHA w/oA (VITAPEARL) 30-1.4-200 MG capsule controlled-release, Take 1 capsule by mouth Daily., Disp: 90 capsule, Rfl: 3  •  Prenatal Vit-Fe Fumarate-FA (PRENATAL VITAMIN 28-0.8) 28-0.8 MG tablet tablet, Take 1 tablet by mouth Daily., Disp: 90 tablet, Rfl: 9    Past Surgical History:   Procedure Laterality Date   • NO PAST SURGERIES         OB History         3    Para   2    Term   2            AB        Living   2       SAB        TAB        Ectopic        Molar        Multiple   0    Live Births   2                Past Medical History:   Diagnosis Date   • Migraine    • Patient denies medical problems    • Rh negative status during pregnancy 2018       Family History   Problem Relation Age of Onset   • Diabetes Mother    • No Known Problems Father    • No Known Problems Daughter    • Hypertension Maternal Uncle    • Hypertension Maternal Grandmother    • Hypertension Maternal Grandfather

## 2020-07-17 ENCOUNTER — ROUTINE PRENATAL (OUTPATIENT)
Dept: OBSTETRICS AND GYNECOLOGY | Facility: CLINIC | Age: 25
End: 2020-07-17

## 2020-07-17 VITALS — SYSTOLIC BLOOD PRESSURE: 118 MMHG | WEIGHT: 164 LBS | BODY MASS INDEX: 25.69 KG/M2 | DIASTOLIC BLOOD PRESSURE: 60 MMHG

## 2020-07-17 DIAGNOSIS — Z36.89 SCREENING, ANTENATAL, FOR FETAL ANATOMIC SURVEY: Primary | ICD-10-CM

## 2020-07-17 DIAGNOSIS — Z34.82 ENCOUNTER FOR SUPERVISION OF OTHER NORMAL PREGNANCY IN SECOND TRIMESTER: ICD-10-CM

## 2020-07-17 PROCEDURE — 0502F SUBSEQUENT PRENATAL CARE: CPT | Performed by: MIDWIFE

## 2020-07-17 NOTE — PROGRESS NOTES
Chief Complaint   Patient presents with   • Routine Prenatal Visit     anatomy scan today, no complaints        HPI: Camille is a  currently at 18w1d who today reports the following:   Leaking - No; Heartburn - No. Fetal movement - flutters.  Taking Claritin for allergy symptoms    ROS:   GI:   Nausea - No; Constipation - No;    Neuro:  Headache - No; Visual disturbances - No.    Social History    Tobacco Use      Smoking status: Never Smoker      Smokeless tobacco: Never Used      EXAM:   Vitals:  See prenatal flowsheet as noted and reviewed /60, Wt +6#   Abdomen:   See prenatal flowsheet as noted and reviewed, soft, nontender   Pelvic:  See prenatal flowsheet as noted and reviewed   Urine:  See prenatal flowsheet as noted and reviewed    Lab Results   Component Value Date    ABO O 2020    RH Negative 2020    ABSCRN Negative 2020       MDM:  Impression: Supervision of low risk pregnancy   Tests done today: U/S for anatomic screening - anatomy completely seen today   Topics discussed: genetic screening  kick counts and fetal movement   Declined quad screen   Tests next visit: none                RTO:                        4 weeks    This note was electronically signed.  Subha Arteaga, APRN  2020

## 2020-08-17 ENCOUNTER — ROUTINE PRENATAL (OUTPATIENT)
Dept: OBSTETRICS AND GYNECOLOGY | Facility: CLINIC | Age: 25
End: 2020-08-17

## 2020-08-17 VITALS — WEIGHT: 170 LBS | DIASTOLIC BLOOD PRESSURE: 64 MMHG | BODY MASS INDEX: 26.63 KG/M2 | SYSTOLIC BLOOD PRESSURE: 122 MMHG

## 2020-08-17 DIAGNOSIS — Z67.91 RH NEGATIVE STATUS DURING PREGNANCY IN SECOND TRIMESTER: Primary | ICD-10-CM

## 2020-08-17 DIAGNOSIS — Z34.92 NORMAL PREGNANCY, SECOND TRIMESTER: ICD-10-CM

## 2020-08-17 DIAGNOSIS — O26.892 RH NEGATIVE STATUS DURING PREGNANCY IN SECOND TRIMESTER: Primary | ICD-10-CM

## 2020-08-17 PROCEDURE — 0502F SUBSEQUENT PRENATAL CARE: CPT | Performed by: NURSE PRACTITIONER

## 2020-08-17 NOTE — PATIENT INSTRUCTIONS
"Eating Plan for Pregnant Women  While you are pregnant, your body will require additional nutrition to help support your growing baby. It is recommended that you consume:  · 150 additional calories each day during your first trimester.  · 300 additional calories each day during your second trimester.  · 300 additional calories each day during your third trimester.  Eating a healthy, well-balanced diet is very important for your health and for your baby's health. You also have a higher need for some vitamins and minerals, such as folic acid, calcium, iron, and vitamin D.  WHAT DO I NEED TO KNOW ABOUT EATING DURING PREGNANCY?  · Do not try to lose weight or go on a diet during pregnancy.  · Choose healthy, nutritious foods. Choose ½ of a sandwich with a glass of milk instead of a candy bar or a high-calorie sugar-sweetened beverage.  · Limit your overall intake of foods that have \"empty calories.\" These are foods that have little nutritional value, such as sweets, desserts, candies, sugar-sweetened beverages, and fried foods.  · Eat a variety of foods, especially fruits and vegetables.  · Take a prenatal vitamin to help meet the additional needs during pregnancy, specifically for folic acid, iron, calcium, and vitamin D.  · Remember to stay active. Ask your health care provider for exercise recommendations that are specific to you.  · Practice good food safety and cleanliness, such as washing your hands before you eat and after you prepare raw meat. This helps to prevent foodborne illnesses, such as listeriosis, that can be very dangerous for your baby. Ask your health care provider for more information about listeriosis.  WHAT DOES 150 EXTRA CALORIES LOOK LIKE?  Healthy options for an additional 150 calories each day could be any of the following:  · Plain low-fat yogurt (6-8 oz) with ½ cup of berries.  · 1 apple with 2 teaspoons of peanut butter.  · Cut-up vegetables with ¼ cup of hummus.  · Low-fat chocolate milk " "(8 oz or 1 cup).  · 1 string cheese with 1 medium orange.  · ½ of a peanut butter and jelly sandwich on whole-wheat bread (1 tsp of peanut butter).  For 300 calories, you could eat two of those healthy options each day.   WHAT IS A HEALTHY AMOUNT OF WEIGHT TO GAIN?  The recommended amount of weight for you to gain is based on your pre-pregnancy BMI. If your pre-pregnancy BMI was:  · Less than 18 (underweight), you should gain 28-40 lb.  · 18-24.9 (normal), you should gain 25-35 lb.  · 25-29.9 (overweight), you should gain 15-25 lb.  · Greater than 30 (obese), you should gain 11-20 lb.  WHAT IF I AM HAVING TWINS OR MULTIPLES?  Generally, pregnant women who will be having twins or multiples may need to increase their daily calories by 300-600 calories each day. The recommended range for total weight gain is 25-54 lb, depending on your pre-pregnancy BMI. Talk with your health care provider for specific guidance about additional nutritional needs, weight gain, and exercise during your pregnancy.  WHAT FOODS CAN I EAT?  Grains  Any grains. Try to choose whole grains, such as whole-wheat bread, oatmeal, or brown rice.  Vegetables  Any vegetables. Try to eat a variety of colors and types of vegetables to get a full range of vitamins and minerals. Remember to wash your vegetables well before eating.  Fruits  Any fruits. Try to eat a variety of colors and types of fruit to get a full range of vitamins and minerals. Remember to wash your fruits well before eating.  Meats and Other Protein Sources  Lean meats, including chicken, turkey, fish, and lean cuts of beef, veal, or pork. Make sure that all meats are cooked to \"well done.\" Tofu. Tempeh. Beans. Eggs. Peanut butter and other nut butters. Seafood, such as shrimp, crab, and lobster. If you choose fish, select types that are higher in omega-3 fatty acids, including salmon, herring, mussels, trout, sardines, and pollock. Make sure that all meats are cooked to food-safe " temperatures.  Dairy  Pasteurized milk and milk alternatives. Pasteurized yogurt and pasteurized cheese. Cottage cheese. Sour cream.  Beverages  Water. Juices that contain 100% fruit juice or vegetable juice. Caffeine-free teas and decaffeinated coffee. Drinks that contain caffeine are okay to drink, but it is better to avoid caffeine. Keep your total caffeine intake to less than 200 mg each day (12 oz of coffee, tea, or soda) or as directed by your health care provider.  Condiments  Any pasteurized condiments.  Sweets and Desserts  Any sweets and desserts.  Fats and Oils  Any fats and oils.  The items listed above may not be a complete list of recommended foods or beverages. Contact your dietitian for more options.  WHAT FOODS ARE NOT RECOMMENDED?  Vegetables  Unpasteurized (raw) vegetable juices.  Fruits  Unpasteurized (raw) fruit juices.  Meats and Other Protein Sources  Cured meats that have nitrates, such as adams, salami, and hotdogs. Luncheon meats, bologna, or other deli meats (unless they are reheated until they are steaming hot). Refrigerated acosta, meat spreads from a meat counter, smoked seafood that is found in the refrigerated section of a store. Raw fish, such as sushi or sashimi. High mercury content fish, such as tilefish, shark, swordfish, and alisson mackerel. Raw meats, such as tuna or beef tartare. Undercooked meats and poultry. Make sure that all meats are cooked to food-safe temperatures.  Dairy  Unpasteurized (raw) milk and any foods that have raw milk in them. Soft cheeses, such as feta, queso mendenhall, queso fresco, Brie, Camembert cheeses, blue-veined cheeses, and Panela cheese (unless it is made with pasteurized milk, which must be stated on the label).  Beverages  Alcohol. Sugar-sweetened beverages, such as sodas, teas, or energy drinks.  Condiments  Homemade fermented foods and drinks, such as pickles, sauerkraut, or kombucha drinks. (Store-bought pasteurized versions of these are  okay.)  Other  Salads that are made in the store, such as ham salad, chicken salad, egg salad, tuna salad, and seafood salad.  The items listed above may not be a complete list of foods and beverages to avoid. Contact your dietitian for more information.     This information is not intended to replace advice given to you by your health care provider. Make sure you discuss any questions you have with your health care provider.

## 2020-08-17 NOTE — PROGRESS NOTES
[id number]  Chief Complaint   Patient presents with   • Routine Prenatal Visit     No complaints.         HPI  Camille is a  currently at 22w4d who today reports the following:    No c/o - good FM     Contractions - No;   Leaking - No;   Vaginal bleeding -  No;   Swelling of extremities - No.       ROS:        GI: Nausea - No;   Constipation - No;   Diarrhea - No    Neuro: Headache - No;   Visual change - No    Pertinent items are noted in HPI, all other systems reviewed and negative     Social History     Socioeconomic History   • Marital status:      Spouse name: Not on file   • Number of children: Not on file   • Years of education: Not on file   • Highest education level: Not on file   Tobacco Use   • Smoking status: Never Smoker   • Smokeless tobacco: Never Used   Substance and Sexual Activity   • Alcohol use: No   • Drug use: No   • Sexual activity: Defer     Partners: Male     Birth control/protection: None          EXAM  /64   Wt 77.1 kg (170 lb)   BMI 26.63 kg/m²  -See Prenatal Assessment  General Appearance:  Pleasant  Lungs: Breathing unlabored  Abdomen:  See flow sheet for Fundal ht, FM, FHT's  LE: Neg edema  V/E: Not performed     Lab Results   Component Value Date    ABO O 2020    RH Negative 2020    ABSCRN Negative 2020       MDM  Impression: Supervision of normal pregnancy   Rh neg    Tests done today: none   Topics discussed: continue to note good FM -   Rhogam at 28 wks   Nutririon reviewed  & written info provided on eating in pregnancy   encouraged questions - call prn    Tests next visit: Glucola and CBC  antibody screen         Current Outpatient Medications:   •  Qrenpz-UiXjj-Lpyxw-FA-DHA w/oA (VITAPEARL) 30-1.4-200 MG capsule controlled-release, Take 1 capsule by mouth Daily., Disp: 90 capsule, Rfl: 3  •  Prenatal Vit-Fe Fumarate-FA (PRENATAL VITAMIN 28-0.8) 28-0.8 MG tablet tablet, Take 1 tablet by mouth Daily., Disp: 90 tablet, Rfl: 9    Past Surgical  History:   Procedure Laterality Date   • NO PAST SURGERIES         OB History        3    Para   2    Term   2            AB        Living   2       SAB        TAB        Ectopic        Molar        Multiple   0    Live Births   2                Past Medical History:   Diagnosis Date   • Migraine    • Patient denies medical problems    • Rh negative status during pregnancy 2018       Family History   Problem Relation Age of Onset   • Diabetes Mother    • No Known Problems Father    • No Known Problems Daughter    • Hypertension Maternal Uncle    • Hypertension Maternal Grandmother    • Hypertension Maternal Grandfather

## 2020-09-14 ENCOUNTER — ROUTINE PRENATAL (OUTPATIENT)
Dept: OBSTETRICS AND GYNECOLOGY | Facility: CLINIC | Age: 25
End: 2020-09-14

## 2020-09-14 VITALS — WEIGHT: 173 LBS | SYSTOLIC BLOOD PRESSURE: 100 MMHG | BODY MASS INDEX: 27.1 KG/M2 | DIASTOLIC BLOOD PRESSURE: 60 MMHG

## 2020-09-14 DIAGNOSIS — Z67.91 RH NEGATIVE STATUS DURING PREGNANCY IN SECOND TRIMESTER: ICD-10-CM

## 2020-09-14 DIAGNOSIS — Z34.92 NORMAL PREGNANCY, SECOND TRIMESTER: ICD-10-CM

## 2020-09-14 DIAGNOSIS — O26.892 RH NEGATIVE STATUS DURING PREGNANCY IN SECOND TRIMESTER: ICD-10-CM

## 2020-09-14 DIAGNOSIS — Z34.92 PRENATAL CARE IN SECOND TRIMESTER: Primary | ICD-10-CM

## 2020-09-14 PROCEDURE — 0502F SUBSEQUENT PRENATAL CARE: CPT | Performed by: OBSTETRICS & GYNECOLOGY

## 2020-09-14 NOTE — PROGRESS NOTES
Chief Complaint   Patient presents with   • Routine Prenatal Visit     Glucola done today, back pain.       HPI:   Camille is a  currently at 26w4d who today reports the following:  Contractions - No; Leaking - No; Vaginal bleeding -  No; Swelling of extremities - No. Good fetal movement - YES.    ROS:  GI: Nausea - No; Constipation - No; Diarrhea - No. RUQ pain - No    Neuro: Headache - No; Visual disturbances - No.    Pertinent items are noted in HPI, all other systems reviewed and negative    Review of History:  The following portions of the patient's history were reviewed and updated as appropriate:problem list, current medications, allergies, past family history, past medical history, past social history and past surgical history.    Current Outpatient Medications on File Prior to Visit   Medication Sig Dispense Refill   • Hnivnu-NnMqa-Cyomm-FA-DHA w/oA (VITAPEARL) 30-1.4-200 MG capsule controlled-release Take 1 capsule by mouth Daily. 90 capsule 3   • Prenatal Vit-Fe Fumarate-FA (PRENATAL VITAMIN 28-0.8) 28-0.8 MG tablet tablet Take 1 tablet by mouth Daily. 90 tablet 9     No current facility-administered medications on file prior to visit.        EXAM:  /60   Wt 78.5 kg (173 lb)   BMI 27.10 kg/m²     Gen: NAD, conversant  Pulm: No use of accessory muscles, normal respirations  Abdomen: Gravid, nontender, size = dates, + fetal cardiac activity  Ext: no edema, no rashes, WWP  Gait: normal for pregnancy  Psych: Mood, insight, judgement intact  SVE: not performed    Lab Results   Component Value Date    ABO O 2020    RH Negative 2020    ABSCRN Negative 2020       Social History    Tobacco Use      Smoking status: Never Smoker      Smokeless tobacco: Never Used      I have reviewed the prenatal labs and previous ultrasounds today.    MDM:  Diagnosis: Supervision of low risk pregnancy  Rh negative   Tests/Orders/Rx today: Orders Placed This Encounter   Procedures   • Gestational  Screen 1 Hr ([facility])   • CBC & Differential     Order Specific Question:   Manual Differential     Answer:   No     Meds Ordered: none   Topics discussed: Prenatal care milestones   Tests next visit: none   Next visit: 4 weeks     Travon Garcia MD  Obstetrics and Gynecology  [facility]

## 2020-09-15 DIAGNOSIS — O99.019 IRON DEFICIENCY ANEMIA DURING PREGNANCY: Primary | ICD-10-CM

## 2020-09-15 DIAGNOSIS — D50.9 IRON DEFICIENCY ANEMIA DURING PREGNANCY: Primary | ICD-10-CM

## 2020-09-15 LAB
BASOPHILS # BLD AUTO: 0.02 10*3/MM3 (ref 0–0.2)
BASOPHILS NFR BLD AUTO: 0.3 % (ref 0–1.5)
EOSINOPHIL # BLD AUTO: 0.27 10*3/MM3 (ref 0–0.4)
EOSINOPHIL NFR BLD AUTO: 3.8 % (ref 0.3–6.2)
ERYTHROCYTE [DISTWIDTH] IN BLOOD BY AUTOMATED COUNT: 11.7 % (ref 12.3–15.4)
GLUCOSE 1H P 50 G GLC PO SERPL-MCNC: 122 MG/DL (ref 65–139)
HCT VFR BLD AUTO: 31.1 % (ref 34–46.6)
HGB BLD-MCNC: 10.3 G/DL (ref 12–15.9)
IMM GRANULOCYTES # BLD AUTO: 0.03 10*3/MM3 (ref 0–0.05)
IMM GRANULOCYTES NFR BLD AUTO: 0.4 % (ref 0–0.5)
LYMPHOCYTES # BLD AUTO: 1.81 10*3/MM3 (ref 0.7–3.1)
LYMPHOCYTES NFR BLD AUTO: 25.5 % (ref 19.6–45.3)
MCH RBC QN AUTO: 31.8 PG (ref 26.6–33)
MCHC RBC AUTO-ENTMCNC: 33.1 G/DL (ref 31.5–35.7)
MCV RBC AUTO: 96 FL (ref 79–97)
MONOCYTES # BLD AUTO: 0.37 10*3/MM3 (ref 0.1–0.9)
MONOCYTES NFR BLD AUTO: 5.2 % (ref 5–12)
NEUTROPHILS # BLD AUTO: 4.61 10*3/MM3 (ref 1.7–7)
NEUTROPHILS NFR BLD AUTO: 64.8 % (ref 42.7–76)
NRBC BLD AUTO-RTO: 0 /100 WBC (ref 0–0.2)
PLATELET # BLD AUTO: 202 10*3/MM3 (ref 140–450)
RBC # BLD AUTO: 3.24 10*6/MM3 (ref 3.77–5.28)
WBC # BLD AUTO: 7.11 10*3/MM3 (ref 3.4–10.8)

## 2020-09-15 RX ORDER — FERROUS SULFATE 325(65) MG
325 TABLET ORAL
Qty: 60 TABLET | Refills: 6 | Status: SHIPPED | OUTPATIENT
Start: 2020-09-15 | End: 2021-08-03

## 2020-10-06 ENCOUNTER — TELEPHONE (OUTPATIENT)
Dept: OBSTETRICS AND GYNECOLOGY | Facility: CLINIC | Age: 25
End: 2020-10-06

## 2020-10-06 NOTE — TELEPHONE ENCOUNTER
----- Message from Moraima Lara sent at 10/6/2020 11:34 AM EDT -----  Regarding: PHONE CALL  Contact: PT  OB PT IS 29W5D AND CALLED REQUESTING TO SPEAK WITH DR WAY.  SHE HAS QUESTIONS ABOUT HER JOB.  PLEASE CALL HER BACK -[phone].  THANKS

## 2020-10-12 ENCOUNTER — ROUTINE PRENATAL (OUTPATIENT)
Dept: OBSTETRICS AND GYNECOLOGY | Facility: CLINIC | Age: 25
End: 2020-10-12

## 2020-10-12 VITALS — BODY MASS INDEX: 27.25 KG/M2 | WEIGHT: 174 LBS | DIASTOLIC BLOOD PRESSURE: 62 MMHG | SYSTOLIC BLOOD PRESSURE: 100 MMHG

## 2020-10-12 DIAGNOSIS — Z34.93 NORMAL PREGNANCY, THIRD TRIMESTER: Primary | ICD-10-CM

## 2020-10-12 DIAGNOSIS — R21 RASH: ICD-10-CM

## 2020-10-12 PROCEDURE — 0502F SUBSEQUENT PRENATAL CARE: CPT | Performed by: NURSE PRACTITIONER

## 2020-10-12 RX ORDER — CLOTRIMAZOLE AND BETAMETHASONE DIPROPIONATE 10; .64 MG/G; MG/G
CREAM TOPICAL 2 TIMES DAILY
Qty: 45 G | Refills: 0 | Status: SHIPPED | OUTPATIENT
Start: 2020-10-12 | End: 2020-12-15 | Stop reason: HOSPADM

## 2020-10-12 NOTE — PROGRESS NOTES
[id number]  Chief Complaint   Patient presents with   • Routine Prenatal Visit     Patient c/o of a rash on lower abdomen and panty line. Patient states she had had episodes of dizziness, and feeling very faint        HPI  Camille is a  currently at 30w4d who today reports the following:    Described orthostatic hypotension with specific activity   Resolves with laying down  Rash on lower abdomen - itches - uncertain where it came from - started out small area and spread - has not been exposed to anything  Good FM       Contractions - No;   Leaking - No;   Vaginal bleeding -  No;   Swelling of extremities - No.       ROS:        GI: Nausea - No;   Constipation - No;   Diarrhea - No    Neuro: Headache - No;   Visual change - No    Pertinent items are noted in HPI, all other systems reviewed and negative     Social History     Socioeconomic History   • Marital status:      Spouse name: Not on file   • Number of children: Not on file   • Years of education: Not on file   • Highest education level: Not on file   Social Needs   • Financial resource strain: Not hard at all   • Food insecurity     Worry: Never true     Inability: Never true   • Transportation needs     Medical: Patient refused     Non-medical: Patient refused   Tobacco Use   • Smoking status: Never Smoker   • Smokeless tobacco: Never Used   Substance and Sexual Activity   • Alcohol use: No   • Drug use: No   • Sexual activity: Defer     Partners: Male     Birth control/protection: None   Lifestyle   • Physical activity     Days per week: Patient refused     Minutes per session: Patient refused   • Stress: Not at all          EXAM  /62   Wt 78.9 kg (174 lb)   BMI 27.25 kg/m²  -See Prenatal Assessment  General Appearance:  Pleasant  Lungs: Breathing unlabored  Skin: rash appears fungal - no open sores   Abdomen:  See flow sheet for Fundal ht, FM, FHT's  LE: Neg edema  V/E: Not performed     Lab Results   Component Value Date    ABO O  2020    RH Negative 2020    ABSCRN Negative 2020       MDM  Impression: Pregnancy with active problem(s) &/or complication(s)   Rash   Orthostatic hypotension   Tests done today: none   Topics discussed: continue to note good FM  Flu vaccination  T-dap   lotrisone to rash - if does not resolve or worsens - to be seen   preventive measures of orthostatic hypotension  encouraged questions - call prn    Tests next visit: U/S          Current Outpatient Medications:   •  ferrous sulfate 325 (65 FE) MG tablet, Take 1 tablet by mouth 2 (Two) Times a Day Before Meals., Disp: 60 tablet, Rfl: 6  •  Wyvtqh-CvRdo-Vqiuj-FA-DHA w/oA (VITAPEARL) 30-1.4-200 MG capsule controlled-release, Take 1 capsule by mouth Daily., Disp: 90 capsule, Rfl: 3  •  Prenatal Vit-Fe Fumarate-FA (PRENATAL VITAMIN 28-0.8) 28-0.8 MG tablet tablet, Take 1 tablet by mouth Daily., Disp: 90 tablet, Rfl: 9    Past Surgical History:   Procedure Laterality Date   • NO PAST SURGERIES         OB History        3    Para   2    Term   2            AB        Living   2       SAB        TAB        Ectopic        Molar        Multiple   0    Live Births   2                Past Medical History:   Diagnosis Date   • Migraine    • Patient denies medical problems    • Rh negative status during pregnancy 2018       Family History   Problem Relation Age of Onset   • Diabetes Mother    • No Known Problems Father    • No Known Problems Daughter    • Hypertension Maternal Uncle    • Hypertension Maternal Grandmother    • Hypertension Maternal Grandfather

## 2020-10-26 ENCOUNTER — ROUTINE PRENATAL (OUTPATIENT)
Dept: OBSTETRICS AND GYNECOLOGY | Facility: CLINIC | Age: 25
End: 2020-10-26

## 2020-10-26 VITALS — SYSTOLIC BLOOD PRESSURE: 108 MMHG | WEIGHT: 173 LBS | BODY MASS INDEX: 27.1 KG/M2 | DIASTOLIC BLOOD PRESSURE: 56 MMHG

## 2020-10-26 DIAGNOSIS — R42 DIZZY SPELLS: ICD-10-CM

## 2020-10-26 DIAGNOSIS — Z34.83 ENCOUNTER FOR SUPERVISION OF OTHER NORMAL PREGNANCY, THIRD TRIMESTER: Primary | ICD-10-CM

## 2020-10-26 DIAGNOSIS — Z36.89 ENCOUNTER FOR ULTRASOUND TO CHECK FETAL GROWTH: ICD-10-CM

## 2020-10-26 DIAGNOSIS — Z67.91 RH NEGATIVE STATUS DURING PREGNANCY IN THIRD TRIMESTER: ICD-10-CM

## 2020-10-26 DIAGNOSIS — O99.019 IRON DEFICIENCY ANEMIA DURING PREGNANCY: ICD-10-CM

## 2020-10-26 DIAGNOSIS — O26.893 RH NEGATIVE STATUS DURING PREGNANCY IN THIRD TRIMESTER: ICD-10-CM

## 2020-10-26 DIAGNOSIS — D50.9 IRON DEFICIENCY ANEMIA DURING PREGNANCY: ICD-10-CM

## 2020-10-26 PROCEDURE — 96372 THER/PROPH/DIAG INJ SC/IM: CPT | Performed by: OBSTETRICS & GYNECOLOGY

## 2020-10-26 PROCEDURE — 0502F SUBSEQUENT PRENATAL CARE: CPT | Performed by: OBSTETRICS & GYNECOLOGY

## 2020-10-26 NOTE — PROGRESS NOTES
Chief Complaint   Patient presents with   • Routine Prenatal Visit     Growth scan today, patient advised symptoms of low blood pressure happening more frequently.        HPI: Camille is a  currently at 32w4d who today reports the following:  Contractions - No; Leaking - No; Vaginal bleeding -  No; Heartburn - No.  The patient is without complaints other than episodes of feeling dizzy and lightheaded.  Patient reports she will break out in a cold sweat.  She reports she will have tunnel vision and feel like she is going to collapse.  She has not had any loss of consciousness.  Her glucose levels have been normal.  Patient does not have a way of monitoring her blood pressure at home.  She does report she drinks very little fluids.  Patient has been taking her iron supplements.  She does report good fetal movement.  Patient denies any cramping or contractions.   She reports she may be sitting or standing when these episodes occur.  The patient is Rh-.  She has not received her RhoGam.  ROS:   GI:   Nausea - No; Constipation - No; Diarrhea - No.   Neuro:  Headache - No; Visual disturbances - No.    EXAM:   Vitals:  See prenatal flowsheet as noted and reviewed   General: Alert, cooperative, and does not appear in any distress   Lungs:  Respirations regular, even and unlabored   Abdomen:   See prenatal flowsheet as noted and reviewed     Uterus gravid, non-tender; no palpable masses     No guarding or rebound tenderness   Pelvic:  See prenatal flowsheet as noted and reviewed   Ext:  See prenatal flowsheet as noted and reviewed     Moves extremities well, no cyanosis and no redness   Skin:  No bleeding, bruising or rash and no lesions noted   Psych:  Normal mood and affect, oriented and thought content appropriate   Urine:  See prenatal flowsheet as noted and reviewed    Prenatal Labs  Lab Results   Component Value Date    HGB 10.3 (L) 2020    RUBELLAABIGG 2.03 2020    HEPBSAG Negative 2020    ABO  O 2020    RH Negative 2020    ABSCRN Negative 2020    [id number] Non Reactive 2020    HEPCVIRUSABY <0.1 2020    STREPGPB Negative 2018    URINECX Final report 2018       No visits with results within 1 Month(s) from this visit.   Latest known visit with results is:   Routine Prenatal on 2020   Component Date Value   • Gestational Diabetes Scr* 2020 122    • WBC 2020 7.11    • RBC 2020 3.24*   • Hemoglobin 2020 10.3*   • Hematocrit 2020 31.1*   • MCV 2020 96.0    • MCH 2020 31.8    • MCHC 2020 33.1    • RDW 2020 11.7*   • Platelets 2020 202    • Neutrophil Rel % 2020 64.8    • Lymphocyte Rel % 2020 25.5    • Monocyte Rel % 2020 5.2    • Eosinophil Rel % 2020 3.8    • Basophil Rel % 2020 0.3    • Neutrophils Absolute 2020 4.61    • Lymphocytes Absolute 2020 1.81    • Monocytes Absolute 2020 0.37    • Eosinophils Absolute 2020 0.27    • Basophils Absolute 2020 0.02    • Immature Granulocyte Rel* 2020 0.4    • Immature Grans Absolute 2020 0.03    • nRBC 2020 0.0        Imaging Results  US Ob Follow Up Transabdominal Approach  Camille Still  : 1995  MRN: [id number]  Date: 10/26/2020    Reason for exam/History:  Growth    Ultrasound images are reviewed.  There is noted to be a viable   intrauterine pregnancy. The pregnancy is measuring 33 weeks 4 days   gestation.  The estimated fetal weight is 2156 grams at the 61.9 % for   growth.  The fetal heart rate was normal.   The infant was in the vertex   presentation.  The placental location was noted to be anterior.  The   amniotic fluid index was 14.38 cms.    The exam limitations noted:  none    See ultrasound report for measurements and structures identified.    Tatiana Gray MD, RDMS  [facility]  OB GYN [place]       MDM:  Impression: Supervision of low risk  pregnancy  Dizzy/lightheaded   Anemia  Rh negative   Tests done today: U/S for EFW - see report   Rhogam   Topics discussed: iron supplementation  kick counts and fetal movement  PIH precautions   labor signs and symptoms  Discussed with the patient the various reasons for her dizziness and lightheadedness.  I discussed with the patient postural hypotension as well as dehydration being likely causes.  I have also discussed with the patient her anemia playing a role as well.  Patient is instructed to increase her p.o. intake.  Patient is also instructed in no standing for prolonged periods of time.  Patient is to continue her iron supplements as noted.  Instructions and precautions are given.  Patient is to follow-up if worsening of her symptoms.   Tests next visit: none     This note was electronically signed.  Tatiana Gray M.D.

## 2020-11-16 ENCOUNTER — ROUTINE PRENATAL (OUTPATIENT)
Dept: OBSTETRICS AND GYNECOLOGY | Facility: CLINIC | Age: 25
End: 2020-11-16

## 2020-11-16 VITALS — DIASTOLIC BLOOD PRESSURE: 62 MMHG | SYSTOLIC BLOOD PRESSURE: 110 MMHG | WEIGHT: 175 LBS | BODY MASS INDEX: 27.41 KG/M2

## 2020-11-16 DIAGNOSIS — Z34.83 ENCOUNTER FOR SUPERVISION OF OTHER NORMAL PREGNANCY IN THIRD TRIMESTER: Primary | ICD-10-CM

## 2020-11-16 PROCEDURE — 0502F SUBSEQUENT PRENATAL CARE: CPT | Performed by: MIDWIFE

## 2020-11-16 NOTE — PROGRESS NOTES
Chief Complaint   Patient presents with   • Routine Prenatal Visit     No complaints       HPI: Camille is a  currently at 35w4d who today reports the following:  Contractions - No; Leaking - No; Vaginal bleeding -  No; Swelling of extremities - No; Baby is active - YES    ROS:   GI:   Nausea - No; Constipation - No   Neuro:  Headache - No; Visual disturbances - No.    Social History    Tobacco Use      Smoking status: Never Smoker      Smokeless tobacco: Never Used      EXAM:   Vitals:  See prenatal flowsheet, /62, Wt +2#   Abdomen:   See prenatal flowsheet as noted and reviewed, soft, nontender   Pelvic:  See prenatal flowsheet   Urine:  See prenatal flowsheet as noted and reviewed    MDM:  Impression: Supervision of low risk pregnancy  Anemia in pregnancy   Tests done today: none   Topics discussed: kick counts and fetal movement   labor signs and symptoms   Tests next visit: GBS testing   Next visit: 1 weeks     This note was electronically signed.  Subha Arteaga, ANTHONY  2020

## 2020-11-23 ENCOUNTER — ROUTINE PRENATAL (OUTPATIENT)
Dept: OBSTETRICS AND GYNECOLOGY | Facility: CLINIC | Age: 25
End: 2020-11-23

## 2020-11-23 VITALS — SYSTOLIC BLOOD PRESSURE: 112 MMHG | DIASTOLIC BLOOD PRESSURE: 62 MMHG | BODY MASS INDEX: 27.1 KG/M2 | WEIGHT: 173 LBS

## 2020-11-23 DIAGNOSIS — Z34.93 NORMAL PREGNANCY, THIRD TRIMESTER: ICD-10-CM

## 2020-11-23 DIAGNOSIS — Z3A.36 36 WEEKS GESTATION OF PREGNANCY: Primary | ICD-10-CM

## 2020-11-23 PROCEDURE — 0502F SUBSEQUENT PRENATAL CARE: CPT | Performed by: NURSE PRACTITIONER

## 2020-11-23 NOTE — PROGRESS NOTES
"[id number]  Chief Complaint   Patient presents with   • Routine Prenatal Visit     Patient c/o feeling weak, feels like she is going to pass out.         HPI  Camille is a  currently at 36w4d who today reports the following:    Spells of feeling week - extremities feel heavy - very heavy   \"Felt mentally in a fog\"  Feels sleeping well - not feeling exhausted - \"just don't feel right\"  Eating well   Good FM     Contractions - No;   Leaking - No;   Vaginal bleeding -  No;   Swelling of extremities - No.       ROS:        GI: Nausea - No;   Constipation - No;   Diarrhea - No    Neuro: Headache - No;   Visual change - No    Pertinent items are noted in HPI, all other systems reviewed and negative     Social History     Tobacco Use   • Smoking status: Never Smoker   • Smokeless tobacco: Never Used   Substance Use Topics   • Alcohol use: No   • Drug use: No          EXAM  /62   Wt 78.5 kg (173 lb)   BMI 27.10 kg/m²  -See Prenatal Assessment  General Appearance:  Pleasant -   Lungs: Breathing unlabored  Abdomen:  See flow sheet for Fundal ht, FM, FHT's  Extremities: Full ROM with all extremities  Neg edema DTR's 2+ f  V/E: Not performed     Lab Results   Component Value Date    ABO O 2020    RH Negative 2020    ABSCRN Negative 2020       MDM  Impression: Pregnancy with active problem(s) &/or complication(s)   Maternal exhaustion vs an unknown medical condition   Tests done today: CBC, TSH, CMP   Topics discussed: To  for labs & observation - does not feel the need to go to [facility] - decision to do labs in office with clear understanding to go to ER or [facility] if  not feeling well - reports  will be with her.    Instructed adequate rest and fluids.    Continue to note good FM   Encouraged questions and answered   Tests next visit: none         Current Outpatient Medications:   •  ferrous sulfate 325 (65 FE) MG tablet, Take 1 tablet by mouth 2 (Two) Times a Day Before Meals., Disp: 60 tablet, Rfl: " 6  •  Ctyaux-NdLmh-Btaoo-FA-DHA w/oA (VITAPEARL) 30-1.4-200 MG capsule controlled-release, Take 1 capsule by mouth Daily., Disp: 90 capsule, Rfl: 3  •  Prenatal Vit-Fe Fumarate-FA (PRENATAL VITAMIN 28-0.8) 28-0.8 MG tablet tablet, Take 1 tablet by mouth Daily., Disp: 90 tablet, Rfl: 9  •  clotrimazole-betamethasone (LOTRISONE) 1-0.05 % cream, Apply  topically to the appropriate area as directed 2 (Two) Times a Day for 10 days. Apply sparingly, Disp: 45 g, Rfl: 0    Past Surgical History:   Procedure Laterality Date   • NO PAST SURGERIES         OB History        3    Para   2    Term   2            AB        Living   2       SAB        TAB        Ectopic        Molar        Multiple   0    Live Births   2                Past Medical History:   Diagnosis Date   • Migraine    • Patient denies medical problems    • Rh negative status during pregnancy 2018       Family History   Problem Relation Age of Onset   • Diabetes Mother    • No Known Problems Father    • No Known Problems Daughter    • Hypertension Maternal Uncle    • Hypertension Maternal Grandmother    • Hypertension Maternal Grandfather

## 2020-11-24 LAB
ALBUMIN SERPL-MCNC: 3.4 G/DL (ref 3.5–5.2)
ALBUMIN/GLOB SERPL: 1.5 G/DL
ALP SERPL-CCNC: 102 U/L (ref 39–117)
ALT SERPL-CCNC: 6 U/L (ref 1–33)
AST SERPL-CCNC: 11 U/L (ref 1–32)
BILIRUB SERPL-MCNC: 0.2 MG/DL (ref 0–1.2)
BUN SERPL-MCNC: 6 MG/DL (ref 6–20)
BUN/CREAT SERPL: 12 (ref 7–25)
CALCIUM SERPL-MCNC: 8.6 MG/DL (ref 8.6–10.5)
CHLORIDE SERPL-SCNC: 103 MMOL/L (ref 98–107)
CO2 SERPL-SCNC: 22.3 MMOL/L (ref 22–29)
CREAT SERPL-MCNC: 0.5 MG/DL (ref 0.57–1)
ERYTHROCYTE [DISTWIDTH] IN BLOOD BY AUTOMATED COUNT: 12.6 % (ref 12.3–15.4)
GLOBULIN SER CALC-MCNC: 2.2 GM/DL
GLUCOSE SERPL-MCNC: 73 MG/DL (ref 65–99)
HCT VFR BLD AUTO: 30.5 % (ref 34–46.6)
HGB BLD-MCNC: 10.5 G/DL (ref 12–15.9)
MCH RBC QN AUTO: 32.7 PG (ref 26.6–33)
MCHC RBC AUTO-ENTMCNC: 34.4 G/DL (ref 31.5–35.7)
MCV RBC AUTO: 95 FL (ref 79–97)
PLATELET # BLD AUTO: 167 10*3/MM3 (ref 140–450)
POTASSIUM SERPL-SCNC: 4 MMOL/L (ref 3.5–5.2)
PROT SERPL-MCNC: 5.6 G/DL (ref 6–8.5)
RBC # BLD AUTO: 3.21 10*6/MM3 (ref 3.77–5.28)
SODIUM SERPL-SCNC: 134 MMOL/L (ref 136–145)
TSH SERPL DL<=0.005 MIU/L-ACNC: 1.56 UIU/ML (ref 0.27–4.2)
WBC # BLD AUTO: 7.11 10*3/MM3 (ref 3.4–10.8)

## 2020-11-25 LAB — GP B STREP DNA SPEC QL NAA+PROBE: NEGATIVE

## 2020-11-30 ENCOUNTER — ROUTINE PRENATAL (OUTPATIENT)
Dept: OBSTETRICS AND GYNECOLOGY | Facility: CLINIC | Age: 25
End: 2020-11-30

## 2020-11-30 VITALS — SYSTOLIC BLOOD PRESSURE: 110 MMHG | BODY MASS INDEX: 27.88 KG/M2 | DIASTOLIC BLOOD PRESSURE: 60 MMHG | WEIGHT: 178 LBS

## 2020-11-30 DIAGNOSIS — Z34.83 ENCOUNTER FOR SUPERVISION OF OTHER NORMAL PREGNANCY IN THIRD TRIMESTER: Primary | ICD-10-CM

## 2020-11-30 DIAGNOSIS — D50.9 IRON DEFICIENCY ANEMIA DURING PREGNANCY: ICD-10-CM

## 2020-11-30 DIAGNOSIS — O99.019 IRON DEFICIENCY ANEMIA DURING PREGNANCY: ICD-10-CM

## 2020-11-30 PROCEDURE — 0502F SUBSEQUENT PRENATAL CARE: CPT | Performed by: OBSTETRICS & GYNECOLOGY

## 2020-12-01 NOTE — PROGRESS NOTES
Chief Complaint   Patient presents with   • Routine Prenatal Visit     Patient complains of pressure.        HPI: Camille is a  currently at 37w5d who today reports the following:  Contractions - No; Leaking - No; Vaginal bleeding -  No; Heartburn - No.  Patient is without complaints other than an increase in pressure.  Patient is not aware of any irregular contractions.  Patient reports good fetal movement.  She denies any vaginal bleeding or spotting.  ROS:   GI:   Nausea - No; Constipation - No; Diarrhea - No.   Neuro:  Headache - No; Visual disturbances - No.    EXAM:   Vitals:  See prenatal flowsheet as noted and reviewed   General: Alert, cooperative, and does not appear in any distress   Lungs:  Respirations regular, even and unlabored   Abdomen:   See prenatal flowsheet as noted and reviewed     Uterus gravid, non-tender; no palpable masses     No guarding or rebound tenderness   Pelvic:  See prenatal flowsheet as noted and reviewed   Ext:  See prenatal flowsheet as noted and reviewed     Moves extremities well, no cyanosis and no redness   Skin:  No bleeding, bruising or rash and no lesions noted   Psych:  Normal mood and affect, oriented and thought content appropriate   Urine:  See prenatal flowsheet as noted and reviewed    Prenatal Labs  Lab Results   Component Value Date    HGB 10.5 (L) 2020    RUBELLAABIGG 2.03 2020    HEPBSAG Negative 2020    ABO O 2020    RH Negative 2020    ABSCRN Negative 2020    [id number] Non Reactive 2020    HEPCVIRUSABY <0.1 2020    STREPGPB Negative 2020    URINECX Final report 2018       Routine Prenatal on 2020   Component Date Value   • Strep Gp B ALEJA 2020 Negative    • WBC 2020 7.11    • RBC 2020 3.21*   • Hemoglobin 2020 10.5*   • Hematocrit 2020 30.5*   • MCV 2020 95.0    • MCH 2020 32.7    • MCHC 2020 34.4    • RDW 2020 12.6    • Platelets  2020 167    • Glucose 2020 73    • BUN 2020 6    • Creatinine 2020 0.50*   • eGFR Non African Am 2020 148    • eGFR  Am 2020 >150    • BUN/Creatinine Ratio 2020 12.0    • Sodium 2020 134*   • Potassium 2020 4.0    • Chloride 2020 103    • Total CO2 2020 22.3    • Calcium 2020 8.6    • Total Protein 2020 5.6*   • Albumin 2020 3.40*   • Globulin 2020 2.2    • A/G Ratio 2020 1.5    • Total Bilirubin 2020 0.2    • Alkaline Phosphatase 2020 102    • AST (SGOT) 2020 11    • ALT (SGPT) 2020 6    • TSH 2020 1.560        Imaging Results  US Ob Follow Up Transabdominal Approach  Camille Still  : 1995  MRN: [id number]  Date: 10/26/2020    Reason for exam/History:  Growth    Ultrasound images are reviewed.  There is noted to be a viable   intrauterine pregnancy. The pregnancy is measuring 33 weeks 4 days   gestation.  The estimated fetal weight is 2156 grams at the 61.9 % for   growth.  The fetal heart rate was normal.   The infant was in the vertex   presentation.  The placental location was noted to be anterior.  The   amniotic fluid index was 14.38 cms.    The exam limitations noted:  none    See ultrasound report for measurements and structures identified.    Tatiana Gray MD, RDMS  [facility]  OB G[facility]       MDM:  Impression: Supervision of low risk pregnancy   anemia   Tests done today: none   Topics discussed: induction of labor -patient desires an induction of labor if undelivered by 39 weeks  kick counts and fetal movement  labor signs and symptoms  PIH precautions  Continue iron supplements   Tests next visit: none     This note was electronically signed.  Tatiana Gray M.D.

## 2020-12-07 ENCOUNTER — ROUTINE PRENATAL (OUTPATIENT)
Dept: OBSTETRICS AND GYNECOLOGY | Facility: CLINIC | Age: 25
End: 2020-12-07

## 2020-12-07 VITALS — SYSTOLIC BLOOD PRESSURE: 112 MMHG | DIASTOLIC BLOOD PRESSURE: 62 MMHG | WEIGHT: 180 LBS | BODY MASS INDEX: 28.19 KG/M2

## 2020-12-07 DIAGNOSIS — Z34.93 NORMAL PREGNANCY, THIRD TRIMESTER: Primary | ICD-10-CM

## 2020-12-07 PROCEDURE — 0502F SUBSEQUENT PRENATAL CARE: CPT | Performed by: NURSE PRACTITIONER

## 2020-12-07 NOTE — PROGRESS NOTES
[id number]  Chief Complaint   Patient presents with   • Routine Prenatal Visit     Patient states she is having contractions, some are strong        HPI  Camille is a  currently at 38w4d who today reports the following:    Good FM   No plans for induction at this time       Contractions - yes   Leaking - No;   Vaginal bleeding -  No;   Swelling of extremities - No.       ROS:        GI: Nausea - No;   Constipation - No;   Diarrhea - No    Neuro: Headache - No;   Visual change - No    Pertinent items are noted in HPI, all other systems reviewed and negative     Social History     Tobacco Use   • Smoking status: Never Smoker   • Smokeless tobacco: Never Used   Substance Use Topics   • Alcohol use: No   • Drug use: No          EXAM  /62   Wt 81.6 kg (180 lb)   BMI 28.19 kg/m²  -See Prenatal Assessment  General Appearance:  Pleasant  Lungs: Breathing unlabored  Abdomen:  See flow sheet for Fundal ht, FM, FHT's  LE: Neg edema  V/E: 3/70%/-2 very post cx     Lab Results   Component Value Date    ABO O 2020    RH Negative 2020    ABSCRN Negative 2020       MDM  Impression: Supervision of normal pregnancy   Anemia   Tests done today: none   Topics discussed: S/S labor and adeq FM/Kick Counts  Continue iron  encouraged questions - call prn    Tests next visit: none         Current Outpatient Medications:   •  ferrous sulfate 325 (65 FE) MG tablet, Take 1 tablet by mouth 2 (Two) Times a Day Before Meals., Disp: 60 tablet, Rfl: 6  •  Wqeiao-XvMxx-Rqgoq-FA-DHA w/oA (VITAPEARL) 30-1.4-200 MG capsule controlled-release, Take 1 capsule by mouth Daily., Disp: 90 capsule, Rfl: 3  •  Prenatal Vit-Fe Fumarate-FA (PRENATAL VITAMIN 28-0.8) 28-0.8 MG tablet tablet, Take 1 tablet by mouth Daily., Disp: 90 tablet, Rfl: 9  •  clotrimazole-betamethasone (LOTRISONE) 1-0.05 % cream, Apply  topically to the appropriate area as directed 2 (Two) Times a Day for 10 days. Apply sparingly, Disp: 45 g, Rfl: 0    Past  Surgical History:   Procedure Laterality Date   • NO PAST SURGERIES         OB History        3    Para   2    Term   2            AB        Living   2       SAB        TAB        Ectopic        Molar        Multiple   0    Live Births   2                Past Medical History:   Diagnosis Date   • Migraine    • Patient denies medical problems    • Rh negative status during pregnancy 2018       Family History   Problem Relation Age of Onset   • Diabetes Mother    • No Known Problems Father    • No Known Problems Daughter    • Hypertension Maternal Uncle    • Hypertension Maternal Grandmother    • Hypertension Maternal Grandfather

## 2020-12-13 ENCOUNTER — ANESTHESIA EVENT (OUTPATIENT)
Dept: LABOR AND DELIVERY | Facility: HOSPITAL | Age: 25
End: 2020-12-13

## 2020-12-13 ENCOUNTER — ANESTHESIA (OUTPATIENT)
Dept: LABOR AND DELIVERY | Facility: HOSPITAL | Age: 25
End: 2020-12-13

## 2020-12-13 ENCOUNTER — HOSPITAL ENCOUNTER (INPATIENT)
Facility: HOSPITAL | Age: 25
LOS: 1 days | Discharge: HOME | End: 2020-12-15
Attending: NURSE PRACTITIONER | Admitting: OBSTETRICS & GYNECOLOGY

## 2020-12-13 LAB
A1 MICROGLOB PLACENTAL VAG QL: POSITIVE
BASOPHILS # BLD AUTO: 0.02 10*3/MM3 (ref 0–0.2)
BASOPHILS NFR BLD AUTO: 0.2 % (ref 0–1.5)
DEPRECATED RDW RBC AUTO: 41.9 FL (ref 37–54)
EOSINOPHIL # BLD AUTO: 0.13 10*3/MM3 (ref 0–0.4)
EOSINOPHIL NFR BLD AUTO: 1.5 % (ref 0.3–6.2)
ERYTHROCYTE [DISTWIDTH] IN BLOOD BY AUTOMATED COUNT: 12.2 % (ref 12.3–15.4)
HCT VFR BLD AUTO: 32.1 % (ref 34–46.6)
HGB BLD-MCNC: 11.2 G/DL (ref 12–15.9)
IMM GRANULOCYTES # BLD AUTO: 0.03 10*3/MM3 (ref 0–0.05)
IMM GRANULOCYTES NFR BLD AUTO: 0.3 % (ref 0–0.5)
LYMPHOCYTES # BLD AUTO: 2.59 10*3/MM3 (ref 0.7–3.1)
LYMPHOCYTES NFR BLD AUTO: 29.2 % (ref 19.6–45.3)
MCH RBC QN AUTO: 32.6 PG (ref 26.6–33)
MCHC RBC AUTO-ENTMCNC: 34.9 G/DL (ref 31.5–35.7)
MCV RBC AUTO: 93.3 FL (ref 79–97)
MONOCYTES # BLD AUTO: 0.75 10*3/MM3 (ref 0.1–0.9)
MONOCYTES NFR BLD AUTO: 8.5 % (ref 5–12)
NEUTROPHILS NFR BLD AUTO: 5.34 10*3/MM3 (ref 1.7–7)
NEUTROPHILS NFR BLD AUTO: 60.3 % (ref 42.7–76)
NRBC BLD AUTO-RTO: 0 /100 WBC (ref 0–0.2)
PLATELET # BLD AUTO: 158 10*3/MM3 (ref 140–450)
PMV BLD AUTO: 10.9 FL (ref 6–12)
RBC # BLD AUTO: 3.44 10*6/MM3 (ref 3.77–5.28)
WBC # BLD AUTO: 8.86 10*3/MM3 (ref 3.4–10.8)

## 2020-12-13 PROCEDURE — 86922 COMPATIBILITY TEST ANTIGLOB: CPT

## 2020-12-13 PROCEDURE — 86920 COMPATIBILITY TEST SPIN: CPT

## 2020-12-13 PROCEDURE — 86850 RBC ANTIBODY SCREEN: CPT | Performed by: NURSE PRACTITIONER

## 2020-12-13 PROCEDURE — 86921 COMPATIBILITY TEST INCUBATE: CPT

## 2020-12-13 PROCEDURE — 84112 EVAL AMNIOTIC FLUID PROTEIN: CPT | Performed by: NURSE PRACTITIONER

## 2020-12-13 PROCEDURE — 86900 BLOOD TYPING SEROLOGIC ABO: CPT | Performed by: NURSE PRACTITIONER

## 2020-12-13 PROCEDURE — 86901 BLOOD TYPING SEROLOGIC RH(D): CPT | Performed by: NURSE PRACTITIONER

## 2020-12-13 PROCEDURE — 36415 COLL VENOUS BLD VENIPUNCTURE: CPT | Performed by: NURSE PRACTITIONER

## 2020-12-13 PROCEDURE — 85025 COMPLETE CBC W/AUTO DIFF WBC: CPT | Performed by: NURSE PRACTITIONER

## 2020-12-13 PROCEDURE — 86870 RBC ANTIBODY IDENTIFICATION: CPT | Performed by: NURSE PRACTITIONER

## 2020-12-13 RX ORDER — SODIUM CHLORIDE 0.9 % (FLUSH) 0.9 %
3 SYRINGE (ML) INJECTION EVERY 12 HOURS SCHEDULED
Status: DISCONTINUED | OUTPATIENT
Start: 2020-12-13 | End: 2020-12-14 | Stop reason: HOSPADM

## 2020-12-13 RX ORDER — SODIUM CHLORIDE 0.9 % (FLUSH) 0.9 %
1-10 SYRINGE (ML) INJECTION AS NEEDED
Status: DISCONTINUED | OUTPATIENT
Start: 2020-12-13 | End: 2020-12-14 | Stop reason: HOSPADM

## 2020-12-13 RX ORDER — SODIUM CHLORIDE, SODIUM LACTATE, POTASSIUM CHLORIDE, CALCIUM CHLORIDE 600; 310; 30; 20 MG/100ML; MG/100ML; MG/100ML; MG/100ML
125 INJECTION, SOLUTION INTRAVENOUS CONTINUOUS
Status: DISCONTINUED | OUTPATIENT
Start: 2020-12-13 | End: 2020-12-14

## 2020-12-13 RX ORDER — PROMETHAZINE HYDROCHLORIDE 12.5 MG/1
12.5 TABLET ORAL EVERY 6 HOURS PRN
Status: DISCONTINUED | OUTPATIENT
Start: 2020-12-13 | End: 2020-12-14 | Stop reason: HOSPADM

## 2020-12-13 RX ORDER — PROMETHAZINE HYDROCHLORIDE 12.5 MG/1
12.5 SUPPOSITORY RECTAL EVERY 6 HOURS PRN
Status: DISCONTINUED | OUTPATIENT
Start: 2020-12-13 | End: 2020-12-14 | Stop reason: HOSPADM

## 2020-12-13 RX ORDER — MORPHINE SULFATE 2 MG/ML
6 INJECTION, SOLUTION INTRAMUSCULAR; INTRAVENOUS EVERY 4 HOURS PRN
Status: DISCONTINUED | OUTPATIENT
Start: 2020-12-13 | End: 2020-12-14 | Stop reason: HOSPADM

## 2020-12-13 RX ORDER — MORPHINE SULFATE 4 MG/ML
4 INJECTION, SOLUTION INTRAMUSCULAR; INTRAVENOUS EVERY 4 HOURS PRN
Status: DISCONTINUED | OUTPATIENT
Start: 2020-12-13 | End: 2020-12-14 | Stop reason: HOSPADM

## 2020-12-13 RX ORDER — BUPIVACAINE HYDROCHLORIDE 2.5 MG/ML
INJECTION, SOLUTION EPIDURAL; INFILTRATION; INTRACAUDAL AS NEEDED
Status: DISCONTINUED | OUTPATIENT
Start: 2020-12-13 | End: 2020-12-21 | Stop reason: SURG

## 2020-12-13 RX ORDER — ONDANSETRON 2 MG/ML
4 INJECTION INTRAMUSCULAR; INTRAVENOUS ONCE AS NEEDED
Status: DISCONTINUED | OUTPATIENT
Start: 2020-12-13 | End: 2020-12-14 | Stop reason: HOSPADM

## 2020-12-13 RX ORDER — LIDOCAINE HYDROCHLORIDE 10 MG/ML
5 INJECTION, SOLUTION EPIDURAL; INFILTRATION; INTRACAUDAL; PERINEURAL AS NEEDED
Status: DISCONTINUED | OUTPATIENT
Start: 2020-12-13 | End: 2020-12-14 | Stop reason: HOSPADM

## 2020-12-13 RX ORDER — TRISODIUM CITRATE DIHYDRATE AND CITRIC ACID MONOHYDRATE 500; 334 MG/5ML; MG/5ML
30 SOLUTION ORAL ONCE
Status: DISCONTINUED | OUTPATIENT
Start: 2020-12-14 | End: 2020-12-14 | Stop reason: HOSPADM

## 2020-12-13 RX ORDER — EPHEDRINE SULFATE 5 MG/ML
5 INJECTION INTRAVENOUS
Status: DISCONTINUED | OUTPATIENT
Start: 2020-12-13 | End: 2020-12-14 | Stop reason: HOSPADM

## 2020-12-13 RX ADMIN — BUPIVACAINE HYDROCHLORIDE 1.5 ML: 2.5 INJECTION, SOLUTION EPIDURAL; INFILTRATION; INTRACAUDAL; PERINEURAL at 23:18

## 2020-12-13 RX ADMIN — SODIUM CHLORIDE, POTASSIUM CHLORIDE, SODIUM LACTATE AND CALCIUM CHLORIDE 1000 ML: 600; 310; 30; 20 INJECTION, SOLUTION INTRAVENOUS at 23:14

## 2020-12-13 RX ADMIN — SODIUM CHLORIDE, POTASSIUM CHLORIDE, SODIUM LACTATE AND CALCIUM CHLORIDE 125 ML/HR: 600; 310; 30; 20 INJECTION, SOLUTION INTRAVENOUS at 23:44

## 2020-12-13 RX ADMIN — SODIUM CHLORIDE, POTASSIUM CHLORIDE, SODIUM LACTATE AND CALCIUM CHLORIDE 125 ML/HR: 600; 310; 30; 20 INJECTION, SOLUTION INTRAVENOUS at 22:30

## 2020-12-14 LAB
ABO GROUP BLD: NORMAL
ANTI-D, PASSIVE: NORMAL
BLD GP AB SCN SERPL QL: POSITIVE
RH BLD: NEGATIVE
T&S EXPIRATION DATE: NORMAL

## 2020-12-14 PROCEDURE — 0KQM0ZZ REPAIR PERINEUM MUSCLE, OPEN APPROACH: ICD-10-PCS | Performed by: NURSE PRACTITIONER

## 2020-12-14 PROCEDURE — 25010000002 MORPHINE PER 10 MG: Performed by: NURSE PRACTITIONER

## 2020-12-14 PROCEDURE — 51702 INSERT TEMP BLADDER CATH: CPT

## 2020-12-14 PROCEDURE — 25010000003 LIDOCAINE 1 % SOLUTION: Performed by: NURSE PRACTITIONER

## 2020-12-14 PROCEDURE — 59400 OBSTETRICAL CARE: CPT | Performed by: NURSE PRACTITIONER

## 2020-12-14 RX ORDER — PROMETHAZINE HYDROCHLORIDE 25 MG/1
25 TABLET ORAL EVERY 6 HOURS PRN
Status: DISCONTINUED | OUTPATIENT
Start: 2020-12-14 | End: 2020-12-15 | Stop reason: HOSPADM

## 2020-12-14 RX ORDER — SODIUM CHLORIDE 0.9 % (FLUSH) 0.9 %
1-10 SYRINGE (ML) INJECTION AS NEEDED
Status: DISCONTINUED | OUTPATIENT
Start: 2020-12-14 | End: 2020-12-15 | Stop reason: HOSPADM

## 2020-12-14 RX ORDER — DIPHENHYDRAMINE HCL 25 MG
25 CAPSULE ORAL NIGHTLY PRN
Status: DISCONTINUED | OUTPATIENT
Start: 2020-12-14 | End: 2020-12-15 | Stop reason: HOSPADM

## 2020-12-14 RX ORDER — OXYCODONE HYDROCHLORIDE 5 MG/1
5 TABLET ORAL EVERY 4 HOURS PRN
Status: DISCONTINUED | OUTPATIENT
Start: 2020-12-14 | End: 2020-12-15 | Stop reason: HOSPADM

## 2020-12-14 RX ORDER — ONDANSETRON 2 MG/ML
4 INJECTION INTRAMUSCULAR; INTRAVENOUS ONCE AS NEEDED
Status: DISCONTINUED | OUTPATIENT
Start: 2020-12-14 | End: 2020-12-14 | Stop reason: HOSPADM

## 2020-12-14 RX ORDER — METHYLERGONOVINE MALEATE 0.2 MG/ML
200 INJECTION INTRAVENOUS ONCE AS NEEDED
Status: DISCONTINUED | OUTPATIENT
Start: 2020-12-14 | End: 2020-12-14 | Stop reason: HOSPADM

## 2020-12-14 RX ORDER — PROMETHAZINE HYDROCHLORIDE 12.5 MG/1
12.5 SUPPOSITORY RECTAL EVERY 6 HOURS PRN
Status: DISCONTINUED | OUTPATIENT
Start: 2020-12-14 | End: 2020-12-15 | Stop reason: HOSPADM

## 2020-12-14 RX ORDER — PRENATAL VIT/IRON FUM/FOLIC AC 27MG-0.8MG
1 TABLET ORAL DAILY
Status: DISCONTINUED | OUTPATIENT
Start: 2020-12-14 | End: 2020-12-15 | Stop reason: HOSPADM

## 2020-12-14 RX ORDER — MORPHINE SULFATE 4 MG/ML
4 INJECTION, SOLUTION INTRAMUSCULAR; INTRAVENOUS ONCE AS NEEDED
Status: DISCONTINUED | OUTPATIENT
Start: 2020-12-14 | End: 2020-12-14 | Stop reason: HOSPADM

## 2020-12-14 RX ORDER — HYDROCODONE BITARTRATE AND ACETAMINOPHEN 5; 325 MG/1; MG/1
2 TABLET ORAL ONCE AS NEEDED
Status: DISCONTINUED | OUTPATIENT
Start: 2020-12-14 | End: 2020-12-14 | Stop reason: HOSPADM

## 2020-12-14 RX ORDER — ONDANSETRON 4 MG/1
4 TABLET, FILM COATED ORAL ONCE AS NEEDED
Status: DISCONTINUED | OUTPATIENT
Start: 2020-12-14 | End: 2020-12-14 | Stop reason: HOSPADM

## 2020-12-14 RX ORDER — LANOLIN
CREAM (GRAM) TOPICAL
Status: DISCONTINUED | OUTPATIENT
Start: 2020-12-14 | End: 2020-12-15 | Stop reason: HOSPADM

## 2020-12-14 RX ORDER — OXYTOCIN/0.9 % SODIUM CHLORIDE 30/500 ML
85 PLASTIC BAG, INJECTION (ML) INTRAVENOUS ONCE
Status: COMPLETED | OUTPATIENT
Start: 2020-12-14 | End: 2020-12-14

## 2020-12-14 RX ORDER — OXYTOCIN/0.9 % SODIUM CHLORIDE 30/500 ML
650 PLASTIC BAG, INJECTION (ML) INTRAVENOUS ONCE
Status: COMPLETED | OUTPATIENT
Start: 2020-12-14 | End: 2020-12-14

## 2020-12-14 RX ORDER — HYDROCODONE BITARTRATE AND ACETAMINOPHEN 7.5; 325 MG/1; MG/1
1 TABLET ORAL EVERY 4 HOURS PRN
Status: DISCONTINUED | OUTPATIENT
Start: 2020-12-14 | End: 2020-12-14

## 2020-12-14 RX ORDER — DOCUSATE SODIUM 100 MG/1
100 CAPSULE, LIQUID FILLED ORAL 2 TIMES DAILY
Status: DISCONTINUED | OUTPATIENT
Start: 2020-12-14 | End: 2020-12-15 | Stop reason: HOSPADM

## 2020-12-14 RX ORDER — ACETAMINOPHEN 500 MG
1000 TABLET ORAL EVERY 8 HOURS
Status: DISCONTINUED | OUTPATIENT
Start: 2020-12-14 | End: 2020-12-15 | Stop reason: HOSPADM

## 2020-12-14 RX ORDER — CARBOPROST TROMETHAMINE 250 UG/ML
250 INJECTION, SOLUTION INTRAMUSCULAR AS NEEDED
Status: DISCONTINUED | OUTPATIENT
Start: 2020-12-14 | End: 2020-12-14 | Stop reason: HOSPADM

## 2020-12-14 RX ORDER — HYDROCORTISONE 25 MG/G
1 CREAM TOPICAL AS NEEDED
Status: DISCONTINUED | OUTPATIENT
Start: 2020-12-14 | End: 2020-12-15 | Stop reason: HOSPADM

## 2020-12-14 RX ORDER — HYDROCODONE BITARTRATE AND ACETAMINOPHEN 5; 325 MG/1; MG/1
1 TABLET ORAL EVERY 4 HOURS PRN
Status: DISCONTINUED | OUTPATIENT
Start: 2020-12-14 | End: 2020-12-14

## 2020-12-14 RX ORDER — ACETAMINOPHEN 325 MG/1
650 TABLET ORAL ONCE AS NEEDED
Status: DISCONTINUED | OUTPATIENT
Start: 2020-12-14 | End: 2020-12-14 | Stop reason: HOSPADM

## 2020-12-14 RX ORDER — LIDOCAINE HYDROCHLORIDE 10 MG/ML
20 INJECTION, SOLUTION INFILTRATION; PERINEURAL ONCE
Status: COMPLETED | OUTPATIENT
Start: 2020-12-14 | End: 2020-12-14

## 2020-12-14 RX ORDER — IBUPROFEN 600 MG/1
600 TABLET ORAL EVERY 6 HOURS PRN
Status: DISCONTINUED | OUTPATIENT
Start: 2020-12-14 | End: 2020-12-14

## 2020-12-14 RX ORDER — MISOPROSTOL 200 UG/1
800 TABLET ORAL AS NEEDED
Status: DISCONTINUED | OUTPATIENT
Start: 2020-12-14 | End: 2020-12-14 | Stop reason: HOSPADM

## 2020-12-14 RX ORDER — ONDANSETRON 2 MG/ML
4 INJECTION INTRAMUSCULAR; INTRAVENOUS EVERY 6 HOURS PRN
Status: DISCONTINUED | OUTPATIENT
Start: 2020-12-14 | End: 2020-12-15 | Stop reason: HOSPADM

## 2020-12-14 RX ORDER — ONDANSETRON 4 MG/1
4 TABLET, FILM COATED ORAL EVERY 8 HOURS PRN
Status: DISCONTINUED | OUTPATIENT
Start: 2020-12-14 | End: 2020-12-15 | Stop reason: HOSPADM

## 2020-12-14 RX ORDER — IBUPROFEN 800 MG/1
800 TABLET ORAL EVERY 8 HOURS SCHEDULED
Status: DISCONTINUED | OUTPATIENT
Start: 2020-12-14 | End: 2020-12-15 | Stop reason: HOSPADM

## 2020-12-14 RX ORDER — MORPHINE SULFATE 2 MG/ML
2 INJECTION, SOLUTION INTRAMUSCULAR; INTRAVENOUS ONCE AS NEEDED
Status: DISCONTINUED | OUTPATIENT
Start: 2020-12-14 | End: 2020-12-14 | Stop reason: HOSPADM

## 2020-12-14 RX ORDER — BISACODYL 10 MG
10 SUPPOSITORY, RECTAL RECTAL DAILY PRN
Status: DISCONTINUED | OUTPATIENT
Start: 2020-12-15 | End: 2020-12-15 | Stop reason: HOSPADM

## 2020-12-14 RX ADMIN — MORPHINE SULFATE 4 MG: 4 INJECTION, SOLUTION INTRAMUSCULAR; INTRAVENOUS at 00:37

## 2020-12-14 RX ADMIN — LIDOCAINE HYDROCHLORIDE 20 ML: 10 INJECTION, SOLUTION INFILTRATION; PERINEURAL at 00:54

## 2020-12-14 RX ADMIN — Medication 85 ML/HR: at 01:05

## 2020-12-14 RX ADMIN — Medication 650 ML/HR: at 00:32

## 2020-12-14 RX ADMIN — IBUPROFEN 800 MG: 800 TABLET ORAL at 13:16

## 2020-12-14 RX ADMIN — ACETAMINOPHEN 1000 MG: 500 TABLET, FILM COATED ORAL at 19:16

## 2020-12-14 RX ADMIN — DOCUSATE SODIUM 100 MG: 100 CAPSULE ORAL at 22:19

## 2020-12-14 RX ADMIN — DOCUSATE SODIUM 100 MG: 100 CAPSULE ORAL at 08:06

## 2020-12-14 RX ADMIN — BENZOCAINE AND LEVOMENTHOL: 200; 5 SPRAY TOPICAL at 04:16

## 2020-12-14 RX ADMIN — ACETAMINOPHEN 1000 MG: 500 TABLET, FILM COATED ORAL at 08:58

## 2020-12-14 RX ADMIN — IBUPROFEN 800 MG: 800 TABLET ORAL at 22:19

## 2020-12-14 RX ADMIN — PRENATAL VITAMINS-IRON FUMARATE 27 MG IRON-FOLIC ACID 0.8 MG TABLET 1 TABLET: at 08:06

## 2020-12-14 RX ADMIN — IBUPROFEN 600 MG: 600 TABLET, FILM COATED ORAL at 04:16

## 2020-12-14 NOTE — ANESTHESIA PROCEDURE NOTES
Spinal Block      Patient reassessed immediately prior to procedure    Patient location during procedure: OR  Indication:at surgeon's request, post-op pain management and procedure for pain  Performed By  CRNA: Sergio Arteaga CRNA  Preanesthetic Checklist  Completed: patient identified, site marked, surgical consent, pre-op evaluation, timeout performed, IV checked, risks and benefits discussed and monitors and equipment checked  Spinal Block Prep:  Patient Position:right lateral decubitus  Sterile Tech:cap, gloves and mask  Prep:Chloraprep  Patient Monitoring:blood pressure monitoring and continuous pulse oximetry  Spinal Block Procedure  Approach:midline  Guidance:landmark technique and palpation technique  Location:L3-L4  Needle Type:Pencan  Needle Gauge:25 G  Placement of Spinal needle event:cerebrospinal fluid aspirated  Paresthesia: no  Fluid Appearance:clear     Post Assessment  Patient Tolerance:patient tolerated the procedure well with no apparent complications  Complications no  Additional Notes  Risks discussed , including but not limited to:  Headache, itching, n&v, infection, failure, decreased bp. Permanent chronic back pain, nerve damage, paralysis, etc.

## 2020-12-14 NOTE — L&D DELIVERY NOTE
[facility]  Vaginal Delivery Note    Delivery     Delivery: Vaginal, Spontaneous     YOB: 2022    Time of Birth:  Gestational Age 12:27 AM   39w4d     Anesthesia: Spinal     Delivering clinician: Apple Dominique CNM   Forceps?   No   Vacuum? No    Shoulder dystocia present: No        Delivery narrative:      of viable female over an intact perineum - spontaneous lusty cry and respirations - infant bulb sx'd & to moms abdomen and dried - delayed cord clamping x 60 sec /double clamped and cut per FOB.  Spontaneous delivery of placenta - intact - 20 units pitocin to IVF's - F/F with message.   Vaginal inspection - 2nd degree - 1% Lidocaine local & 4 mg morphine IVP prior to repair - repair sutured with Chrooo.  EBL 300cc.       Infant    Findings: female  infant     Infant observations: Weight: 3685 g (8 lb 2 oz)   Length: 20.5  in  Observations/Comments:        Apgars: 9  @ 1 minute /    9  @ 5 minutes   Infant Name: Amelie     Placenta, Cord, and Fluid    Placenta delivered  Spontaneous  at   2020 12:32 AM     Cord: 3 vessels  present.   Nuchal Cord?  no   Cord blood obtained: Yes    Cord gases obtained:  No    Cord gas results: Venous:  No results found for: PHCVEN    Arterial:  No results found for: PHCART     Repair    Episiotomy: No        Lacerations:    Estimated Blood Loss: Est. Blood Loss (mL): 300 mL(Filed from Delivery Summary) (20 0027)           Complications  none    Disposition  Mother to Mother Baby/Postpartum  in stable condition currently.  Baby to remains with mom  in stable condition currently.      Apple Dominique CNM  20  01:27 EST

## 2020-12-14 NOTE — ANESTHESIA PREPROCEDURE EVALUATION
Anesthesia Evaluation     Patient summary reviewed and Nursing notes reviewed   no history of anesthetic complications:  NPO Solid Status: > 8 hours  NPO Liquid Status: > 8 hours           Airway   Mallampati: I  TM distance: >3 FB  Neck ROM: full  no difficulty expected  Dental - normal exam     Pulmonary - normal exam   Cardiovascular - negative cardio ROS and normal exam    Rhythm: regular  Rate: normal        Neuro/Psych  (+) headaches,     GI/Hepatic/Renal/Endo - negative ROS     Musculoskeletal (-) negative ROS    Abdominal  - normal exam    Abdomen: soft.  Bowel sounds: normal.   Substance History - negative use     OB/GYN    (+) Pregnant,         Other        ROS/Med Hx Other: Plt 158                  Anesthesia Plan    ASA 2 - emergent     epidural   (Risks discussed , including but not limited to:  Headache, itching, n&v, infection, failure, decreased blood pressure, permanent chronic/back pain, nerve damage, paralysis, etc. All questions answered and informed consent obtained)  intravenous induction     Anesthetic plan, all risks, benefits, and alternatives have been provided, discussed and informed consent has been obtained with: patient.

## 2020-12-14 NOTE — H&P
"[id number] [facility]  Camille Still  : 1995  MRN: [id number]  CSN: [id number]    Chief Complain:  contractions    History and Physical    Camille Still is a 25 y.o. year old  with an Estimated Date of Delivery: 20 currently at 39w3d presenting with c/o painful contractions    Prenatal care has been with [facility].   Prenatal course has been uncomplicated .    TWG 29  Total # visits 11       OB History    Para Term  AB Living   3 2 2 0 0 2   SAB TAB Ectopic Molar Multiple Live Births   0 0 0 0 0 2      # Outcome Date GA Lbr Barry/2nd Weight Sex Delivery Anes PTL Lv   3 Current            2 Term 19 39w3d 06:04 / 00:10 3629 g (8 lb) M Vag-Spont EPI N TAYLOR      Name: KATRIN STILL      Apgar1: 8  Apgar5: 9   1 Term 16 39w0d  3629 g (8 lb) F Vag-Spont EPI  TAYLOR     Past Medical History:   Diagnosis Date   • Migraine    • Patient denies medical problems    • Rh negative status during pregnancy 2018     Past Surgical History:   Procedure Laterality Date   • NO PAST SURGERIES         Review of Systems        Pertinent items are noted in HPI, all other systems reviewed and negative  No Known Allergies  Social History    Tobacco Use      Smoking status: Never Smoker      Smokeless tobacco: Never Used      Temp 97.6 °F (36.4 °C) (Oral)   Resp 20   Ht 165.1 cm (65\")   Wt 81.6 kg (180 lb)   BMI 29.95 kg/m²     Physical Exam       Psych: Altert and oriented to time, place and person  Mood and affect appropriate   General: well developed; well nourished  Appropriate discomfort with contractions  Head: normocephalic  Musculoskeletal: Normal gait  Full range of motion  Heart: regular rate and rhythm, no murmur  Lungs:  breathing is unlabored  clear to auscultation bilaterally  Abdomen: Gravid - soft and non-tender  EFW  8#  ctx's q 2 min FHT's 130 + accels and variability  Lower Extremities: Neg edema and DTR's 2+   V/E: Rim BBOW      Prenatal " Labs  Lab Results   Component Value Date    HGB 11.2 (L) 2020    HEPBSAG Negative 2020    ABSCRN Negative 2020    [id number] Non Reactive 2020    HEPCVIRUSABY <0.1 2020    STREPGPB Negative 2020    URINECX Final report 2018       Current Labs Reviewed   CBC    Assessment:    IUP at 39w3d  Active labor  Pain  Group B strep - negative  FHR cat 1    Plan:  Admit to  - O  intrathecal   Anticipate    Encouraged questions and answered        Apple Dominique CNM  2020  23:08 EST

## 2020-12-14 NOTE — PLAN OF CARE
Goal Outcome Evaluation:  Plan of Care Reviewed With: patient, spouse  Progress: improving  Outcome Summary: VSS.  Pain controlled with oral meds.  Bleeding is light.  Caring for infant and breastfeeding.  Continue to monitor.

## 2020-12-15 VITALS
SYSTOLIC BLOOD PRESSURE: 127 MMHG | OXYGEN SATURATION: 97 % | WEIGHT: 180 LBS | DIASTOLIC BLOOD PRESSURE: 62 MMHG | HEART RATE: 75 BPM | TEMPERATURE: 98 F | RESPIRATION RATE: 18 BRPM | HEIGHT: 65 IN | BODY MASS INDEX: 29.99 KG/M2

## 2020-12-15 LAB
ABO GROUP BLD: NORMAL
BASOPHILS # BLD AUTO: 0.04 10*3/MM3 (ref 0–0.2)
BASOPHILS NFR BLD AUTO: 0.5 % (ref 0–1.5)
DEPRECATED RDW RBC AUTO: 43.4 FL (ref 37–54)
EOSINOPHIL # BLD AUTO: 0.23 10*3/MM3 (ref 0–0.4)
EOSINOPHIL NFR BLD AUTO: 3.1 % (ref 0.3–6.2)
ERYTHROCYTE [DISTWIDTH] IN BLOOD BY AUTOMATED COUNT: 12.5 % (ref 12.3–15.4)
FETAL BLEED: NEGATIVE
HCT VFR BLD AUTO: 28.7 % (ref 34–46.6)
HGB BLD-MCNC: 10 G/DL (ref 12–15.9)
IMM GRANULOCYTES # BLD AUTO: 0.05 10*3/MM3 (ref 0–0.05)
IMM GRANULOCYTES NFR BLD AUTO: 0.7 % (ref 0–0.5)
LYMPHOCYTES # BLD AUTO: 3.07 10*3/MM3 (ref 0.7–3.1)
LYMPHOCYTES NFR BLD AUTO: 41.9 % (ref 19.6–45.3)
MCH RBC QN AUTO: 33 PG (ref 26.6–33)
MCHC RBC AUTO-ENTMCNC: 34.8 G/DL (ref 31.5–35.7)
MCV RBC AUTO: 94.7 FL (ref 79–97)
MONOCYTES # BLD AUTO: 0.47 10*3/MM3 (ref 0.1–0.9)
MONOCYTES NFR BLD AUTO: 6.4 % (ref 5–12)
NEUTROPHILS NFR BLD AUTO: 3.47 10*3/MM3 (ref 1.7–7)
NEUTROPHILS NFR BLD AUTO: 47.4 % (ref 42.7–76)
NRBC BLD AUTO-RTO: 0 /100 WBC (ref 0–0.2)
NUMBER OF DOSES: NORMAL
PLATELET # BLD AUTO: 146 10*3/MM3 (ref 140–450)
PMV BLD AUTO: 11 FL (ref 6–12)
RBC # BLD AUTO: 3.03 10*6/MM3 (ref 3.77–5.28)
RH BLD: NEGATIVE
WBC # BLD AUTO: 7.33 10*3/MM3 (ref 3.4–10.8)

## 2020-12-15 PROCEDURE — 86901 BLOOD TYPING SEROLOGIC RH(D): CPT | Performed by: NURSE PRACTITIONER

## 2020-12-15 PROCEDURE — 86900 BLOOD TYPING SEROLOGIC ABO: CPT | Performed by: NURSE PRACTITIONER

## 2020-12-15 PROCEDURE — 25010000002 RHO D IMMUNE GLOBULIN 1500 UNIT/2ML SOLUTION PREFILLED SYRINGE: Performed by: NURSE PRACTITIONER

## 2020-12-15 PROCEDURE — 85025 COMPLETE CBC W/AUTO DIFF WBC: CPT | Performed by: NURSE PRACTITIONER

## 2020-12-15 PROCEDURE — 85461 HEMOGLOBIN FETAL: CPT | Performed by: NURSE PRACTITIONER

## 2020-12-15 RX ORDER — IBUPROFEN 800 MG/1
800 TABLET ORAL EVERY 6 HOURS PRN
Qty: 40 TABLET | Refills: 0 | Status: SHIPPED | OUTPATIENT
Start: 2020-12-15 | End: 2021-08-03

## 2020-12-15 RX ORDER — DOCUSATE SODIUM 100 MG/1
100 CAPSULE, LIQUID FILLED ORAL 2 TIMES DAILY
Qty: 60 CAPSULE | Refills: 0 | Status: SHIPPED | OUTPATIENT
Start: 2020-12-15 | End: 2021-08-03

## 2020-12-15 RX ORDER — ACETAMINOPHEN 500 MG
1000 TABLET ORAL EVERY 8 HOURS
Qty: 60 TABLET | Refills: 0 | Status: SHIPPED | OUTPATIENT
Start: 2020-12-15 | End: 2021-08-03

## 2020-12-15 RX ORDER — FERROUS SULFATE 325(65) MG
325 TABLET ORAL
Qty: 60 TABLET | Refills: 0 | Status: SHIPPED | OUTPATIENT
Start: 2020-12-15 | End: 2021-08-03

## 2020-12-15 RX ADMIN — IBUPROFEN 800 MG: 800 TABLET ORAL at 06:22

## 2020-12-15 RX ADMIN — ACETAMINOPHEN 1000 MG: 500 TABLET, FILM COATED ORAL at 11:09

## 2020-12-15 RX ADMIN — ACETAMINOPHEN 1000 MG: 500 TABLET, FILM COATED ORAL at 02:43

## 2020-12-15 RX ADMIN — DOCUSATE SODIUM 100 MG: 100 CAPSULE ORAL at 08:24

## 2020-12-15 RX ADMIN — PRENATAL VITAMINS-IRON FUMARATE 27 MG IRON-FOLIC ACID 0.8 MG TABLET 1 TABLET: at 08:24

## 2020-12-15 RX ADMIN — HUMAN RHO(D) IMMUNE GLOBULIN 1500 UNITS: 1500 SOLUTION INTRAMUSCULAR; INTRAVENOUS at 11:18

## 2020-12-15 NOTE — PLAN OF CARE
Goal Outcome Evaluation:  Plan of Care Reviewed With: patient  Progress: improving   Patient has progressed well through plan of care.  Plans for discharge home today.

## 2020-12-15 NOTE — DISCHARGE SUMMARY
Discharge Summary     Kimo Still  : 1995  MRN: [id number]  CSN: [id number]    Date of Admission: 2020   Date of Discharge:  12/15/2020   Delivering Physician: Apple Dominique        Admission Diagnosis: 1. Pregnancy [Z34.90]   Discharge Diagnosis: 1. Same as above plus  2. Pregnancy at 39w4d - delivered       Procedures: 2020  - Vaginal, Spontaneous       Hospital Course  Patient is a 25 y.o.  who at 39w4d had a vaginal birth.    Her postpartum course was without complications.  Ambulating, tolerating regular diet, voiding, & passing gas.  She had normal lochia and pain well controlled with oral medications.  On PPD #2 she was ready for discharge.      Infant breastfeeding well.     Infant  female  fetus weighing 3685 g (8 lb 2 oz)   Apgars -  9 @ 1 minute /  9 @ 5 minutes.    Discharge labs  Lab Results   Component Value Date    WBC 7.33 12/15/2020    HGB 10.0 (L) 12/15/2020    HCT 28.7 (L) 12/15/2020     12/15/2020       Discharge Medications     Discharge Medications      New Medications      Instructions Start Date   acetaminophen 500 MG tablet  Commonly known as: TYLENOL   1,000 mg, Oral, Every 8 Hours, Alternate with ibuprofen      docusate sodium 100 MG capsule  Commonly known as: Colace   100 mg, Oral, 2 Times Daily      ibuprofen 800 MG tablet  Commonly known as: ADVIL,MOTRIN   800 mg, Oral, Every 6 Hours PRN         Changes to Medications      Instructions Start Date   ferrous sulfate 325 (65 FE) MG tablet  What changed: Another medication with the same name was added. Make sure you understand how and when to take each.   325 mg, Oral, 2 Times Daily Before Meals      ferrous sulfate 325 (65 FE) MG tablet  What changed: You were already taking a medication with the same name, and this prescription was added. Make sure you understand how and when to take each.   325 mg, Oral, 2 Times Daily Before Meals         Continue These Medications       Instructions Start Date   VitaPearl 30-1.4-200 MG capsule controlled-release   1 capsule, Oral, Daily         Stop These Medications    clotrimazole-betamethasone 1-0.05 % cream  Commonly known as: LOTRISONE     prenatal vitamin 28-0.8 28-0.8 MG tablet tablet            Discharge Disposition Home or Self Care   Condition on Discharge: good   Follow-up: 6 weeks  At [facility] office      Apple Dominique CNM  12/15/2020

## 2020-12-15 NOTE — PROGRESS NOTES
No anesthesia related problems   Malin    Nerve Cath Post Op Call    Patient Name: Camille Still  :  1995  MRN:  [id number]  Date of Discharge:     POST OP CALL

## 2020-12-15 NOTE — PLAN OF CARE
Goal Outcome Evaluation:  Plan of Care Reviewed With: patient, spouse  Progress: improving  Outcome Summary: VSS, pain controlled with pain medications, bleeding scant.

## 2020-12-17 LAB
BH BB BLOOD EXPIRATION DATE: NORMAL
BH BB BLOOD EXPIRATION DATE: NORMAL
BH BB BLOOD TYPE BARCODE: 9500
BH BB BLOOD TYPE BARCODE: 9500
BH BB DISPENSE STATUS: NORMAL
BH BB DISPENSE STATUS: NORMAL
BH BB PRODUCT CODE: NORMAL
BH BB PRODUCT CODE: NORMAL
BH BB UNIT NUMBER: NORMAL
BH BB UNIT NUMBER: NORMAL
CROSSMATCH INTERPRETATION: NORMAL
CROSSMATCH INTERPRETATION: NORMAL
UNIT  ABO: NORMAL
UNIT  ABO: NORMAL
UNIT  RH: NORMAL
UNIT  RH: NORMAL

## 2021-06-04 ENCOUNTER — OFFICE VISIT (OUTPATIENT)
Dept: OBSTETRICS AND GYNECOLOGY | Facility: CLINIC | Age: 26
End: 2021-06-04

## 2021-06-04 VITALS
HEIGHT: 65 IN | WEIGHT: 155 LBS | DIASTOLIC BLOOD PRESSURE: 68 MMHG | BODY MASS INDEX: 25.83 KG/M2 | SYSTOLIC BLOOD PRESSURE: 120 MMHG

## 2021-06-04 DIAGNOSIS — Z01.419 ENCOUNTER FOR GYNECOLOGICAL EXAMINATION WITHOUT ABNORMAL FINDING: ICD-10-CM

## 2021-06-04 DIAGNOSIS — F32.0 CURRENT MILD EPISODE OF MAJOR DEPRESSIVE DISORDER WITHOUT PRIOR EPISODE (HCC): ICD-10-CM

## 2021-06-04 DIAGNOSIS — Z01.419 WELL WOMAN EXAM: Primary | ICD-10-CM

## 2021-06-04 LAB
B-HCG UR QL: NEGATIVE
INTERNAL NEGATIVE CONTROL: ABNORMAL
INTERNAL POSITIVE CONTROL: ABNORMAL
Lab: ABNORMAL

## 2021-06-04 PROCEDURE — 81025 URINE PREGNANCY TEST: CPT | Performed by: NURSE PRACTITIONER

## 2021-06-04 PROCEDURE — 99395 PREV VISIT EST AGE 18-39: CPT | Performed by: NURSE PRACTITIONER

## 2021-06-04 RX ORDER — CLOTRIMAZOLE AND BETAMETHASONE DIPROPIONATE 10; .64 MG/G; MG/G
CREAM TOPICAL 2 TIMES DAILY
Qty: 15 G | Refills: 0 | Status: SHIPPED | OUTPATIENT
Start: 2021-06-04 | End: 2021-09-13

## 2021-06-04 RX ORDER — ACETAMINOPHEN AND CODEINE PHOSPHATE 120; 12 MG/5ML; MG/5ML
1 SOLUTION ORAL DAILY
Qty: 28 TABLET | Refills: 12 | Status: SHIPPED | OUTPATIENT
Start: 2021-06-04 | End: 2021-08-03

## 2021-06-04 NOTE — PROGRESS NOTES
Chief Complaint   Patient presents with   • Gynecologic Exam     Annual and pap. LMP 5/15/2021, last pap 2021. Patient was not seen for post partum visit  2021, PARVIN Dominique delivered, breast feeding. Would like to discuss PPD and birth control     Camille Still is a 26 y.o. year old  presenting to be seen for her annual exam.     She is breastfeeding and wants to continue.    She reports she is starting to have periods again.    She reports deep depression while on her period - states no problems when she is not on her period. Hx of PP depression with previous child - no meds  She also has rash on lower portion of abdomen & groin - has had in the past & has returned      No problems at home - not presently working.   / family good support.        Current Outpatient Medications:   •  Tyexkp-XsLso-Gebdd-FA-DHA w/oA (VITAPEARL) 30-1.4-200 MG capsule controlled-release, Take 1 capsule by mouth Daily., Disp: 90 capsule, Rfl: 3  •  acetaminophen (TYLENOL) 500 MG tablet, Take 2 tablets by mouth Every 8 (Eight) Hours. Alternate with ibuprofen, Disp: 60 tablet, Rfl: 0  •  clotrimazole-betamethasone (LOTRISONE) 1-0.05 % cream, Apply  topically to the appropriate area as directed 2 (Two) Times a Day for 10 days., Disp: 15 g, Rfl: 0  •  docusate sodium (Colace) 100 MG capsule, Take 1 capsule by mouth 2 (Two) Times a Day., Disp: 60 capsule, Rfl: 0  •  ferrous sulfate 325 (65 FE) MG tablet, Take 1 tablet by mouth 2 (Two) Times a Day Before Meals., Disp: 60 tablet, Rfl: 6  •  ferrous sulfate 325 (65 FE) MG tablet, Take 1 tablet by mouth 2 (Two) Times a Day Before Meals., Disp: 60 tablet, Rfl: 0  •  ibuprofen (ADVIL,MOTRIN) 800 MG tablet, Take 1 tablet by mouth Every 6 (Six) Hours As Needed for Mild Pain ., Disp: 40 tablet, Rfl: 0  •  norethindrone (MICRONOR) 0.35 MG tablet, Take 1 tablet by mouth Daily., Disp: 28 tablet, Rfl: 12      Past Medical History:   Diagnosis Date   • Migraine    •  "Patient denies medical problems    • Rh negative status during pregnancy 06/06/2018     Past Surgical History:   Procedure Laterality Date   • NO PAST SURGERIES        Social History     Socioeconomic History   • Marital status:      Spouse name: Not on file   • Number of children: Not on file   • Years of education: Not on file   • Highest education level: Not on file   Tobacco Use   • Smoking status: Never Smoker   • Smokeless tobacco: Never Used   Vaping Use   • Vaping Use: Never used   Substance and Sexual Activity   • Alcohol use: No   • Drug use: No   • Sexual activity: Defer     Partners: Male     Birth control/protection: None        Family History   Problem Relation Age of Onset   • Diabetes Mother    • No Known Problems Father    • No Known Problems Daughter    • Hypertension Maternal Uncle    • Hypertension Maternal Grandmother    • Hypertension Maternal Grandfather      No Known Allergies    The following portions of the patient's history were reviewed and updated as appropriate:problem list, current medications, allergies, past family history, past medical history, past social history and past surgical history.    Objective    /68   Ht 165.1 cm (65\")   Wt 70.3 kg (155 lb)   LMP 05/15/2021   Breastfeeding Yes   BMI 25.79 kg/m²     Physical Exam    Constitutional   The patient is alert & oriented to person, place and time. She is well developed, well nourished.   Neck   The neck is with normal range of motion. Neck is supple and the trachea is midline.    Chest / Pulmonary & Breast   Breathing - unlabored  Breast: declined exam - still breastfeeding     Gastrointestinal   The abdomen is soft and non-tender.  No hepatosplenomegaly noted.  Genitourinary   - External Genitalia without erythema, lesions, or masses  -Vagina - normal pink mucosa without lesions.    -Cervix:  Normal appearance - Negative cervical motion tenderness   Uterus - uterine body is of normal size, shape, & without " tenderness  Adnexa - normal bimanual exam - no masses are palpable  Extremities  Full ROM.   Skin  Appearance of candida infection of lower abdomen and groin     Psychiatric  The patient is oriented to person, place, and time. Speech is fluent and words are clear    Assessment   Annual GYN exam  Depression  Candida infection   Desires birth control     Plan  Pap was not done today - she reports she has always had normal pap -    Chart reviewed :  Last pap 5-20-20 normal   SBE monthly   Kegels daily  We addressed depression specifically r/t menses:  Options discussed - desires counseling - scheduled with ambulatory behavioral health counseling / urgent    All Birth control options while breastfeeding - wants OC's - discussed when to start / back up x 1 month / UPT today - once stops breastfeeding - call for combination OC's  Rash on abdomen - will start with lotrisone - if not resolved - will need dermatologist  Encouraged questions and answered                                         This note was electronically signed.    Apple Dominique CNM   June 4, 2021

## 2021-07-23 ENCOUNTER — TELEPHONE (OUTPATIENT)
Dept: OBSTETRICS AND GYNECOLOGY | Facility: CLINIC | Age: 26
End: 2021-07-23

## 2021-07-23 NOTE — TELEPHONE ENCOUNTER
Spoke with Camille  Reports rash is worsening   Considering it is the weekend - Encouraged to see urgent care

## 2021-07-23 NOTE — TELEPHONE ENCOUNTER
----- Message from Hannah Loja sent at 7/23/2021  2:43 PM EDT -----  Regarding: ITCHING  Patient is requesting a return call concerning itching that she has developed since finding out that she is pregnant. Patient has tried benadryl cream & tablets along with hydrocortisone cream and oatmeal bathes.This has been going on since the 1st week of July. Patient states that it is all over her arms and legs and itches all of the time.     Patient is concerned that she could have a liver problem or something that could be affecting her pregnancy. He New OB appointment is scheduled for 8/3/21. She thinks she is 10 weeks.    Patient call back: [phone]

## 2021-08-03 ENCOUNTER — INITIAL PRENATAL (OUTPATIENT)
Dept: OBSTETRICS AND GYNECOLOGY | Facility: CLINIC | Age: 26
End: 2021-08-03

## 2021-08-03 VITALS — BODY MASS INDEX: 26.29 KG/M2 | DIASTOLIC BLOOD PRESSURE: 60 MMHG | SYSTOLIC BLOOD PRESSURE: 104 MMHG | WEIGHT: 158 LBS

## 2021-08-03 DIAGNOSIS — O09.899 SHORT INTERVAL BETWEEN PREGNANCIES AFFECTING PREGNANCY, ANTEPARTUM: ICD-10-CM

## 2021-08-03 DIAGNOSIS — Z67.91 RH NEGATIVE, ANTEPARTUM: ICD-10-CM

## 2021-08-03 DIAGNOSIS — O26.899 RH NEGATIVE, ANTEPARTUM: ICD-10-CM

## 2021-08-03 DIAGNOSIS — O36.80X0 ENCOUNTER TO DETERMINE FETAL VIABILITY OF PREGNANCY, SINGLE OR UNSPECIFIED FETUS: ICD-10-CM

## 2021-08-03 DIAGNOSIS — Z34.91 PRENATAL CARE IN FIRST TRIMESTER: Primary | ICD-10-CM

## 2021-08-03 DIAGNOSIS — Z34.90 UNPLANNED PREGNANCY: ICD-10-CM

## 2021-08-03 PROBLEM — Z30.2 REQUEST FOR STERILIZATION: Status: ACTIVE | Noted: 2021-08-03

## 2021-08-03 PROCEDURE — 0501F PRENATAL FLOW SHEET: CPT | Performed by: OBSTETRICS & GYNECOLOGY

## 2021-08-03 NOTE — PROGRESS NOTES
New Pregnancy Visit    Subjective   Chief Complaint   Patient presents with   • Initial Prenatal Visit     LMP 05/10/21, TVS done today 12w0d, last pap 20 WNL. No complaints       Camille Still is a 26 y.o. year old .  Patient's last menstrual period was 05/10/2021.  She presents to initiate prenatal care with our group today.     No big problems.  Unplanned pregnancy.  3 previous term vaginal births.  Pelvis proven to 8 pounds 1 ounce.  Most recent delivery was 2020.    Social History    Tobacco Use      Smoking status: Never Smoker      Smokeless tobacco: Never Used      Current Outpatient Medications on File Prior to Visit   Medication Sig Dispense Refill   • Cmeplh-MiXii-Pdwyr-FA-DHA w/oA (VITAPEARL) 30-1.4-200 MG capsule controlled-release Take 1 capsule by mouth Daily. 90 capsule 3   • clotrimazole-betamethasone (LOTRISONE) 1-0.05 % cream Apply  topically to the appropriate area as directed 2 (Two) Times a Day for 10 days. 15 g 0   • [DISCONTINUED] acetaminophen (TYLENOL) 500 MG tablet Take 2 tablets by mouth Every 8 (Eight) Hours. Alternate with ibuprofen 60 tablet 0   • [DISCONTINUED] docusate sodium (Colace) 100 MG capsule Take 1 capsule by mouth 2 (Two) Times a Day. 60 capsule 0   • [DISCONTINUED] ferrous sulfate 325 (65 FE) MG tablet Take 1 tablet by mouth 2 (Two) Times a Day Before Meals. 60 tablet 6   • [DISCONTINUED] ferrous sulfate 325 (65 FE) MG tablet Take 1 tablet by mouth 2 (Two) Times a Day Before Meals. 60 tablet 0   • [DISCONTINUED] ibuprofen (ADVIL,MOTRIN) 800 MG tablet Take 1 tablet by mouth Every 6 (Six) Hours As Needed for Mild Pain . 40 tablet 0   • [DISCONTINUED] norethindrone (MICRONOR) 0.35 MG tablet Take 1 tablet by mouth Daily. 28 tablet 12     No current facility-administered medications on file prior to visit.          Objective   /60   Wt 71.7 kg (158 lb)   LMP 05/10/2021   BMI 26.29 kg/m²   Physical Exam:  Normal, gestational  age-appropriate exam today        Medical Decision Making:    Lab Review:   No data reviewed    Note Review:  None    Imaging Review:  Pelvic ultrasound report  Assessment   1. IUP at 12+0 weeks  2. Supervision of low risk pregnancy   3. Short interval pregnancy  4. Desire for permanent sterilization     Plan    1. The problem list for pregnancy was initiated today  2. Tests/Orders/Rx for today:  Orders Placed This Encounter   Procedures   • NuSwab VG+ - Swab, Cervix     Order Specific Question:   Release to patient     Answer:   Immediate   • US Ob < 14 Weeks Single or First Gestation     Order Specific Question:   Reason for Exam:     Answer:   NOB, dates, viability   • OB Panel With HIV     Order Specific Question:   Release to patient     Answer:   Immediate       Medication Management: None    3. Testing for GC / Chlamydia / trichomonas was done today  4. Genetic testing reviewed: she will consider the information and make a decision at a later date.  5. Information reviewed: exercise in pregnancy, nutrition in pregnancy, weight gain in pregnancy, work and travel restrictions during pregnancy, list of OTC medications acceptable in pregnancy and call coverage groups   6. Patient desires permanent sterilization.  We discussed bilateral tubal ligation today.  Plan to sign paperwork at her [facility] visit.    Follow up: 4 week(s)    Travon Garcia MD  Obstetrics and Gynecology  [facility]

## 2021-08-04 LAB
ABO GROUP BLD: NORMAL
BASOPHILS # BLD AUTO: 0 X10E3/UL (ref 0–0.2)
BASOPHILS NFR BLD AUTO: 0 %
BLD GP AB SCN SERPL QL: NEGATIVE
EOSINOPHIL # BLD AUTO: 0.3 X10E3/UL (ref 0–0.4)
EOSINOPHIL NFR BLD AUTO: 5 %
ERYTHROCYTE [DISTWIDTH] IN BLOOD BY AUTOMATED COUNT: 12.5 % (ref 11.7–15.4)
HBV SURFACE AG SERPL QL IA: NEGATIVE
HCT VFR BLD AUTO: 35.8 % (ref 34–46.6)
HCV AB S/CO SERPL IA: <0.1 S/CO RATIO (ref 0–0.9)
HGB BLD-MCNC: 12.1 G/DL (ref 11.1–15.9)
HIV 1+2 AB+HIV1 P24 AG SERPL QL IA: NON REACTIVE
IMM GRANULOCYTES # BLD AUTO: 0 X10E3/UL (ref 0–0.1)
IMM GRANULOCYTES NFR BLD AUTO: 0 %
LYMPHOCYTES # BLD AUTO: 1.9 X10E3/UL (ref 0.7–3.1)
LYMPHOCYTES NFR BLD AUTO: 29 %
MCH RBC QN AUTO: 31.3 PG (ref 26.6–33)
MCHC RBC AUTO-ENTMCNC: 33.8 G/DL (ref 31.5–35.7)
MCV RBC AUTO: 93 FL (ref 79–97)
MONOCYTES # BLD AUTO: 0.4 X10E3/UL (ref 0.1–0.9)
MONOCYTES NFR BLD AUTO: 6 %
NEUTROPHILS # BLD AUTO: 3.9 X10E3/UL (ref 1.4–7)
NEUTROPHILS NFR BLD AUTO: 60 %
PLATELET # BLD AUTO: 199 X10E3/UL (ref 150–450)
RBC # BLD AUTO: 3.86 X10E6/UL (ref 3.77–5.28)
RH BLD: NEGATIVE
RPR SER QL: NON REACTIVE
RUBV IGG SERPL IA-ACNC: 1.3 INDEX
WBC # BLD AUTO: 6.5 X10E3/UL (ref 3.4–10.8)

## 2021-08-06 LAB
A VAGINAE DNA VAG QL NAA+PROBE: NORMAL SCORE
BVAB2 DNA VAG QL NAA+PROBE: NORMAL SCORE
C ALBICANS DNA VAG QL NAA+PROBE: NEGATIVE
C GLABRATA DNA VAG QL NAA+PROBE: NEGATIVE
C TRACH DNA VAG QL NAA+PROBE: NEGATIVE
MEGA1 DNA VAG QL NAA+PROBE: NORMAL SCORE
N GONORRHOEA DNA VAG QL NAA+PROBE: NEGATIVE
T VAGINALIS DNA VAG QL NAA+PROBE: NEGATIVE

## 2021-09-13 ENCOUNTER — ROUTINE PRENATAL (OUTPATIENT)
Dept: OBSTETRICS AND GYNECOLOGY | Facility: CLINIC | Age: 26
End: 2021-09-13

## 2021-09-13 VITALS — BODY MASS INDEX: 27.12 KG/M2 | SYSTOLIC BLOOD PRESSURE: 110 MMHG | WEIGHT: 163 LBS | DIASTOLIC BLOOD PRESSURE: 64 MMHG

## 2021-09-13 DIAGNOSIS — Z34.92 PRENATAL CARE IN SECOND TRIMESTER: ICD-10-CM

## 2021-09-13 DIAGNOSIS — O26.899 RH NEGATIVE, ANTEPARTUM: ICD-10-CM

## 2021-09-13 DIAGNOSIS — O09.899 SHORT INTERVAL BETWEEN PREGNANCIES AFFECTING PREGNANCY, ANTEPARTUM: ICD-10-CM

## 2021-09-13 DIAGNOSIS — Z30.2 REQUEST FOR STERILIZATION: ICD-10-CM

## 2021-09-13 DIAGNOSIS — Z67.91 RH NEGATIVE, ANTEPARTUM: ICD-10-CM

## 2021-09-13 DIAGNOSIS — Z34.90 UNPLANNED PREGNANCY: ICD-10-CM

## 2021-09-13 DIAGNOSIS — Z36.89 ENCOUNTER FOR FETAL ANATOMIC SURVEY: Primary | ICD-10-CM

## 2021-09-13 PROCEDURE — 0502F SUBSEQUENT PRENATAL CARE: CPT | Performed by: OBSTETRICS & GYNECOLOGY

## 2021-09-13 NOTE — PROGRESS NOTES
Chief Complaint   Patient presents with   • Routine Prenatal Visit     Anatomy scan done today, no complaints       HPI:   Camille is a  currently at 18w0d who today reports the following:  Contractions - No; Leaking - No; Vaginal bleeding -  No; Swelling of extremities - No. Good fetal movement - YES.    ROS:  GI: Nausea - No; Constipation - No; Diarrhea - No. RUQ pain - No    Neuro: Headache - No; Visual disturbances - No.    Pertinent items are noted in HPI, all other systems reviewed and negative    Review of History:  The following portions of the patient's history were reviewed and updated as appropriate:problem list, current medications, allergies, past family history, past medical history, past social history and past surgical history.    Current Outpatient Medications on File Prior to Visit   Medication Sig Dispense Refill   • Eyaqdp-JoMol-Mrger-FA-DHA w/oA (VITAPEARL) 30-1.4-200 MG capsule controlled-release Take 1 capsule by mouth Daily. 90 capsule 3   • [DISCONTINUED] clotrimazole-betamethasone (LOTRISONE) 1-0.05 % cream Apply  topically to the appropriate area as directed 2 (Two) Times a Day for 10 days. 15 g 0     No current facility-administered medications on file prior to visit.       EXAM:  /64   Wt 73.9 kg (163 lb)   LMP 05/10/2021   BMI 27.12 kg/m²     Gen: NAD, conversant  Pulm: No use of accessory muscles, normal respirations  Abdomen: Gravid, nontender, size = dates, + fetal cardiac activity  Ext: no edema, no rashes, WWP  Gait: normal for pregnancy  Psych: Mood, insight, judgement intact  SVE: not performed    Lab Results   Component Value Date    ABO O 2021    RH Negative 2021    ABSCRN Negative 2021       Social History    Tobacco Use      Smoking status: Never Smoker      Smokeless tobacco: Never Used      I have reviewed the prenatal labs and previous ultrasounds today.    MDM:  Diagnosis: Supervision of high risk pregnancy  Rh negative   Short interval  pregnancy  Desire for permanent sterilization   Tests/Orders/Rx today: Orders Placed This Encounter   Procedures   • US Ob 14 + Weeks Single or First Gestation     Order Specific Question:   Reason for Exam:     Answer:   anatomy scan     Meds Ordered: none   Topics discussed: Prenatal care milestones   U/s findings   Tests next visit: none   Next visit: 4 weeks     Travon Garcia MD  Obstetrics and Gynecology  [facility]

## 2021-10-18 ENCOUNTER — ROUTINE PRENATAL (OUTPATIENT)
Dept: OBSTETRICS AND GYNECOLOGY | Facility: CLINIC | Age: 26
End: 2021-10-18

## 2021-10-18 VITALS — SYSTOLIC BLOOD PRESSURE: 112 MMHG | WEIGHT: 171 LBS | DIASTOLIC BLOOD PRESSURE: 68 MMHG | BODY MASS INDEX: 28.46 KG/M2

## 2021-10-18 DIAGNOSIS — O26.899 RH NEGATIVE, ANTEPARTUM: ICD-10-CM

## 2021-10-18 DIAGNOSIS — Z34.92 PRENATAL CARE IN SECOND TRIMESTER: Primary | ICD-10-CM

## 2021-10-18 DIAGNOSIS — Z67.91 RH NEGATIVE, ANTEPARTUM: ICD-10-CM

## 2021-10-18 PROCEDURE — 0502F SUBSEQUENT PRENATAL CARE: CPT | Performed by: NURSE PRACTITIONER

## 2021-10-18 NOTE — PROGRESS NOTES
[id number]  Chief Complaint   Patient presents with   • Routine Prenatal Visit     Patient states she is doing well        HPI  Camille is a  currently at 23w0d who today reports the following:     Good FM - no c/o      EXAM  /68   Wt 77.6 kg (171 lb)   LMP 05/10/2021   BMI 28.46 kg/m²  -See Prenatal Assessment  General Appearance:  Pleasant  Lungs: Breathing unlabored  Abdomen:  See flow sheet for Fundal ht, FM, FHT's  LE: Neg edema  V/E: Not performed     Social History     Tobacco Use   • Smoking status: Never Smoker   • Smokeless tobacco: Never Used   Vaping Use   • Vaping Use: Never used   Substance Use Topics   • Alcohol use: No   • Drug use: No         Lab Results   Component Value Date    ABO O 2021    RH Negative 2021    ABSCRN Negative 2021       MDM  Impression: Supervision of normal pregnancy   Rh neg   Tests done today: none   Topics discussed: continue to note good FM  Flu vaccination  Rhogam 28 wks   Covid vac in pregnancy   encouraged questions - call prn   Written info optional/provided:  -COVID vaccine in pregnancy   Tests next visit: Glucola and CBC  antibody screen

## 2021-11-09 ENCOUNTER — ROUTINE PRENATAL (OUTPATIENT)
Dept: OBSTETRICS AND GYNECOLOGY | Facility: CLINIC | Age: 26
End: 2021-11-09

## 2021-11-09 ENCOUNTER — TELEPHONE (OUTPATIENT)
Dept: OBSTETRICS AND GYNECOLOGY | Facility: CLINIC | Age: 26
End: 2021-11-09

## 2021-11-09 VITALS — WEIGHT: 174 LBS | SYSTOLIC BLOOD PRESSURE: 112 MMHG | BODY MASS INDEX: 28.96 KG/M2 | DIASTOLIC BLOOD PRESSURE: 70 MMHG

## 2021-11-09 DIAGNOSIS — Z34.90 UNPLANNED PREGNANCY: ICD-10-CM

## 2021-11-09 DIAGNOSIS — Z67.91 RH NEGATIVE, ANTEPARTUM: ICD-10-CM

## 2021-11-09 DIAGNOSIS — Z30.2 REQUEST FOR STERILIZATION: ICD-10-CM

## 2021-11-09 DIAGNOSIS — O09.899 SHORT INTERVAL BETWEEN PREGNANCIES AFFECTING PREGNANCY, ANTEPARTUM: ICD-10-CM

## 2021-11-09 DIAGNOSIS — Z34.92 PRENATAL CARE IN SECOND TRIMESTER: Primary | ICD-10-CM

## 2021-11-09 DIAGNOSIS — Z13.1 DIABETES MELLITUS SCREENING: ICD-10-CM

## 2021-11-09 DIAGNOSIS — B37.9 YEAST INFECTION: ICD-10-CM

## 2021-11-09 DIAGNOSIS — O26.899 RH NEGATIVE, ANTEPARTUM: ICD-10-CM

## 2021-11-09 LAB
BASOPHILS # BLD AUTO: 0.03 10*3/MM3 (ref 0–0.2)
BASOPHILS NFR BLD AUTO: 0.4 % (ref 0–1.5)
EOSINOPHIL # BLD AUTO: 0.37 10*3/MM3 (ref 0–0.4)
EOSINOPHIL NFR BLD AUTO: 4.9 % (ref 0.3–6.2)
ERYTHROCYTE [DISTWIDTH] IN BLOOD BY AUTOMATED COUNT: 11.8 % (ref 12.3–15.4)
GLUCOSE 1H P 50 G GLC PO SERPL-MCNC: 116 MG/DL (ref 65–139)
HCT VFR BLD AUTO: 30.5 % (ref 34–46.6)
HGB BLD-MCNC: 10.1 G/DL (ref 12–15.9)
IMM GRANULOCYTES # BLD AUTO: 0.04 10*3/MM3 (ref 0–0.05)
IMM GRANULOCYTES NFR BLD AUTO: 0.5 % (ref 0–0.5)
LYMPHOCYTES # BLD AUTO: 1.51 10*3/MM3 (ref 0.7–3.1)
LYMPHOCYTES NFR BLD AUTO: 19.9 % (ref 19.6–45.3)
MCH RBC QN AUTO: 32.4 PG (ref 26.6–33)
MCHC RBC AUTO-ENTMCNC: 33.1 G/DL (ref 31.5–35.7)
MCV RBC AUTO: 97.8 FL (ref 79–97)
MONOCYTES # BLD AUTO: 0.41 10*3/MM3 (ref 0.1–0.9)
MONOCYTES NFR BLD AUTO: 5.4 % (ref 5–12)
NEUTROPHILS # BLD AUTO: 5.23 10*3/MM3 (ref 1.7–7)
NEUTROPHILS NFR BLD AUTO: 68.9 % (ref 42.7–76)
NRBC BLD AUTO-RTO: 0 /100 WBC (ref 0–0.2)
PLATELET # BLD AUTO: 187 10*3/MM3 (ref 140–450)
RBC # BLD AUTO: 3.12 10*6/MM3 (ref 3.77–5.28)
WBC # BLD AUTO: 7.59 10*3/MM3 (ref 3.4–10.8)

## 2021-11-09 PROCEDURE — 0502F SUBSEQUENT PRENATAL CARE: CPT | Performed by: OBSTETRICS & GYNECOLOGY

## 2021-11-09 RX ORDER — FLUCONAZOLE 150 MG/1
150 TABLET ORAL DAILY
Qty: 1 TABLET | Refills: 0 | Status: SHIPPED | OUTPATIENT
Start: 2021-11-09 | End: 2022-02-08

## 2021-11-09 RX ORDER — NYSTATIN 100000 [USP'U]/G
POWDER TOPICAL 2 TIMES DAILY
Qty: 60 G | Refills: 5 | Status: SHIPPED | OUTPATIENT
Start: 2021-11-09 | End: 2022-02-11 | Stop reason: HOSPADM

## 2021-11-09 NOTE — PROGRESS NOTES
Chief Complaint   Patient presents with   • Routine Prenatal Visit     Glucola done today, severe itching on arms and legs.       HPI:   Camille is a  currently at 26w1d who today reports the following:  Contractions - No; Leaking - No; Vaginal bleeding -  No; Swelling of extremities - No. Good fetal movement - YES.    ROS:  GI: Nausea - No; Constipation - No; Diarrhea - No. RUQ pain - No    Neuro: Headache - No; Visual disturbances - No.    Pertinent items are noted in HPI, all other systems reviewed and negative    Review of History:  The following portions of the patient's history were reviewed and updated as appropriate:problem list, current medications, allergies, past family history, past medical history, past social history and past surgical history.    Current Outpatient Medications on File Prior to Visit   Medication Sig Dispense Refill   • Vjckvd-UiKov-Wjtba-FA-DHA w/oA (VITAPEARL) 30-1.4-200 MG capsule controlled-release Take 1 capsule by mouth Daily. 90 capsule 3     No current facility-administered medications on file prior to visit.       EXAM:  /70   Wt 78.9 kg (174 lb)   LMP 05/10/2021   BMI 28.96 kg/m²     Gen: NAD, conversant  Pulm: No use of accessory muscles, normal respirations  Abdomen: Gravid, nontender, size = dates, + fetal cardiac activity  Ext: no edema, no rashes, WWP  Gait: normal for pregnancy  Psych: Mood, insight, judgement intact  SVE: not performed    Lab Results   Component Value Date    ABO O 2021    RH Negative 2021    ABSCRN Negative 2021       Social History    Tobacco Use      Smoking status: Never Smoker      Smokeless tobacco: Never Used      I have reviewed the prenatal labs and previous ultrasounds today.    MDM:  Diagnosis: Supervision of high risk pregnancy  Rh negative   Short interval pregnancy  Rash  Yeast infection  Desire for permanent sterilization   Tests/Orders/Rx today: Orders Placed This Encounter   Procedures   • Gestational  Screen 1 Hr ([facility])     Order Specific Question:   Release to patient     Answer:   Immediate   • CBC & Differential     Order Specific Question:   Manual Differential     Answer:   No     Meds Ordered: Diflucan + Nystatin powder   Topics discussed: Prenatal care milestones   Yeast  Rash  Tubal r/b/a   Tests next visit: none   Next visit: 4 weeks     Travon Garcia MD  Obstetrics and Gynecology  [facility]

## 2021-11-09 NOTE — TELEPHONE ENCOUNTER
----- Message from Hannah Loja sent at 2021  9:21 AM EST -----  Regarding: PHARMACY CALL BACK  [facility] is requesting a return call concerning patient's insurance not covering nystatin powder. Her insurance will cover nystatin cream. Please inform as to whether Dr. Garcia is okay with changing or if he wants the powder only.      [place] with [phone]

## 2021-11-10 DIAGNOSIS — O99.019 IRON DEFICIENCY ANEMIA DURING PREGNANCY: Primary | ICD-10-CM

## 2021-11-10 DIAGNOSIS — D50.9 IRON DEFICIENCY ANEMIA DURING PREGNANCY: Primary | ICD-10-CM

## 2021-11-10 RX ORDER — FERROUS SULFATE 325(65) MG
325 TABLET ORAL
Qty: 60 TABLET | Refills: 6 | Status: SHIPPED | OUTPATIENT
Start: 2021-11-10 | End: 2022-04-21

## 2021-11-30 ENCOUNTER — APPOINTMENT (OUTPATIENT)
Dept: GENERAL RADIOLOGY | Facility: HOSPITAL | Age: 26
End: 2021-11-30

## 2021-11-30 ENCOUNTER — HOSPITAL ENCOUNTER (EMERGENCY)
Facility: HOSPITAL | Age: 26
Discharge: HOME | End: 2021-11-30
Attending: EMERGENCY MEDICINE | Admitting: EMERGENCY MEDICINE

## 2021-11-30 VITALS
OXYGEN SATURATION: 96 % | WEIGHT: 175 LBS | RESPIRATION RATE: 16 BRPM | HEIGHT: 67 IN | DIASTOLIC BLOOD PRESSURE: 69 MMHG | BODY MASS INDEX: 27.47 KG/M2 | SYSTOLIC BLOOD PRESSURE: 112 MMHG | TEMPERATURE: 97.9 F | HEART RATE: 84 BPM

## 2021-11-30 DIAGNOSIS — R05.9 COUGH: ICD-10-CM

## 2021-11-30 DIAGNOSIS — R07.9 CHEST PAIN, UNSPECIFIED TYPE: ICD-10-CM

## 2021-11-30 DIAGNOSIS — U07.1 COVID-19 VIRUS INFECTION: Primary | ICD-10-CM

## 2021-11-30 LAB
ALBUMIN SERPL-MCNC: 3.4 G/DL (ref 3.5–5.2)
ALBUMIN/GLOB SERPL: 1 G/DL
ALP SERPL-CCNC: 103 U/L (ref 39–117)
ALT SERPL W P-5'-P-CCNC: 6 U/L (ref 1–33)
ANION GAP SERPL CALCULATED.3IONS-SCNC: 12 MMOL/L (ref 5–15)
AST SERPL-CCNC: 17 U/L (ref 1–32)
BASOPHILS # BLD AUTO: 0.01 10*3/MM3 (ref 0–0.2)
BASOPHILS NFR BLD AUTO: 0.2 % (ref 0–1.5)
BILIRUB SERPL-MCNC: 0.3 MG/DL (ref 0–1.2)
BUN SERPL-MCNC: 6 MG/DL (ref 6–20)
BUN/CREAT SERPL: 14.3 (ref 7–25)
CALCIUM SPEC-SCNC: 9.9 MG/DL (ref 8.6–10.5)
CHLORIDE SERPL-SCNC: 106 MMOL/L (ref 98–107)
CO2 SERPL-SCNC: 22 MMOL/L (ref 22–29)
CREAT SERPL-MCNC: 0.42 MG/DL (ref 0.57–1)
DEPRECATED RDW RBC AUTO: 40.4 FL (ref 37–54)
EOSINOPHIL # BLD AUTO: 0.32 10*3/MM3 (ref 0–0.4)
EOSINOPHIL NFR BLD AUTO: 5.2 % (ref 0.3–6.2)
ERYTHROCYTE [DISTWIDTH] IN BLOOD BY AUTOMATED COUNT: 12 % (ref 12.3–15.4)
GFR SERPL CREATININE-BSD FRML MDRD: >150 ML/MIN/1.73
GLOBULIN UR ELPH-MCNC: 3.4 GM/DL
GLUCOSE SERPL-MCNC: 104 MG/DL (ref 65–99)
HCT VFR BLD AUTO: 30.8 % (ref 34–46.6)
HGB BLD-MCNC: 10.8 G/DL (ref 12–15.9)
HOLD SPECIMEN: NORMAL
IMM GRANULOCYTES # BLD AUTO: 0.04 10*3/MM3 (ref 0–0.05)
IMM GRANULOCYTES NFR BLD AUTO: 0.6 % (ref 0–0.5)
LYMPHOCYTES # BLD AUTO: 1.5 10*3/MM3 (ref 0.7–3.1)
LYMPHOCYTES NFR BLD AUTO: 24.3 % (ref 19.6–45.3)
MCH RBC QN AUTO: 32.1 PG (ref 26.6–33)
MCHC RBC AUTO-ENTMCNC: 35.1 G/DL (ref 31.5–35.7)
MCV RBC AUTO: 91.7 FL (ref 79–97)
MONOCYTES # BLD AUTO: 0.41 10*3/MM3 (ref 0.1–0.9)
MONOCYTES NFR BLD AUTO: 6.6 % (ref 5–12)
NEUTROPHILS NFR BLD AUTO: 3.89 10*3/MM3 (ref 1.7–7)
NEUTROPHILS NFR BLD AUTO: 63.1 % (ref 42.7–76)
NRBC BLD AUTO-RTO: 0 /100 WBC (ref 0–0.2)
NT-PROBNP SERPL-MCNC: 25.6 PG/ML (ref 0–450)
PLATELET # BLD AUTO: 225 10*3/MM3 (ref 140–450)
PMV BLD AUTO: 9.7 FL (ref 6–12)
POTASSIUM SERPL-SCNC: 4 MMOL/L (ref 3.5–5.2)
PROT SERPL-MCNC: 6.8 G/DL (ref 6–8.5)
QT INTERVAL: 348 MS
QTC INTERVAL: 393 MS
RBC # BLD AUTO: 3.36 10*6/MM3 (ref 3.77–5.28)
SODIUM SERPL-SCNC: 140 MMOL/L (ref 136–145)
TROPONIN T SERPL-MCNC: <0.01 NG/ML (ref 0–0.03)
WBC NRBC COR # BLD: 6.17 10*3/MM3 (ref 3.4–10.8)
WHOLE BLOOD HOLD SPECIMEN: NORMAL
WHOLE BLOOD HOLD SPECIMEN: NORMAL

## 2021-11-30 PROCEDURE — 93005 ELECTROCARDIOGRAM TRACING: CPT | Performed by: EMERGENCY MEDICINE

## 2021-11-30 PROCEDURE — 84484 ASSAY OF TROPONIN QUANT: CPT

## 2021-11-30 PROCEDURE — 71045 X-RAY EXAM CHEST 1 VIEW: CPT

## 2021-11-30 PROCEDURE — 85025 COMPLETE CBC W/AUTO DIFF WBC: CPT

## 2021-11-30 PROCEDURE — 93005 ELECTROCARDIOGRAM TRACING: CPT

## 2021-11-30 PROCEDURE — 83880 ASSAY OF NATRIURETIC PEPTIDE: CPT

## 2021-11-30 PROCEDURE — 80053 COMPREHEN METABOLIC PANEL: CPT

## 2021-11-30 PROCEDURE — 99284 EMERGENCY DEPT VISIT MOD MDM: CPT

## 2021-11-30 RX ORDER — ACETAMINOPHEN 500 MG
500 TABLET ORAL EVERY 4 HOURS PRN
Qty: 20 TABLET | Refills: 0 | Status: SHIPPED | OUTPATIENT
Start: 2021-11-30 | End: 2022-04-21

## 2021-11-30 RX ORDER — SODIUM CHLORIDE 0.9 % (FLUSH) 0.9 %
10 SYRINGE (ML) INJECTION AS NEEDED
Status: DISCONTINUED | OUTPATIENT
Start: 2021-11-30 | End: 2021-12-01 | Stop reason: HOSPADM

## 2021-12-01 NOTE — ED PROVIDER NOTES
[place]    EMERGENCY DEPARTMENT ENCOUNTER      Pt Name: Camille Still  MRN: [id number]  YOB: 1995  Date of evaluation: 11/30/2021  Provider: Brett Ordaz DO    CHIEF COMPLAINT       Chief Complaint   Patient presents with   • Shortness of Breath         HISTORY OF PRESENT ILLNESS  (Location/Symptom, Timing/Onset, Context/Setting, Quality, Duration, Modifying Factors, Severity.)   Camille Still is a 26 y.o. female who presents to the emergency department for evaluation of a generalized chest congestion which she notes been present for the last few days. She was diagnosed with a Covid about a week ago. She is currently 29 weeks pregnant, no complications with her pregnancy. She is G4, P3. She notes that this evening started having some chest pressure mainly in the upper bilateral chest region with some intermittent shortness of breath, cough. Denies hemoptysis. She denies any underlying cardiac or pulmonary history, non-smoker. She is taking prenatal vitamins. She denies any other acute systemic complaints at this time.      Nursing notes were reviewed.    REVIEW OF SYSTEMS    (2-9 systems for level 4, 10 or more for level 5)   ROS:  General:  No fevers, no chills, no weakness  Cardiovascular:  + chest pain, no palpitations  Respiratory:  + shortness of breath, + cough, no wheezing  Gastrointestinal:  No pain, no nausea, no vomiting, no diarrhea  Musculoskeletal:  No muscle pain, no joint pain  Skin:  No rash  Neurologic:  No headache  Psychiatric:  No anxiety  Genitourinary:  No dysuria, no hematuria    Except as noted above the remainder of the review of systems was reviewed and negative.       PAST MEDICAL HISTORY     Past Medical History:   Diagnosis Date   • Migraine    • Patient denies medical problems    • Rh negative status during pregnancy 06/06/2018         SURGICAL HISTORY       Past Surgical History:   Procedure Laterality Date   • NO PAST SURGERIES    "        CURRENT MEDICATIONS       Current Facility-Administered Medications:   •  sodium chloride 0.9 % flush 10 mL, 10 mL, Intravenous, PRN, Brett Ordaz, DO    Current Outpatient Medications:   •  acetaminophen (TYLENOL) 500 MG tablet, Take 1 tablet by mouth Every 4 (Four) Hours As Needed for Mild Pain  or Fever., Disp: 20 tablet, Rfl: 0  •  ferrous sulfate 325 (65 FE) MG tablet, Take 1 tablet by mouth 2 (Two) Times a Day Before Meals., Disp: 60 tablet, Rfl: 6  •  fluconazole (Diflucan) 150 MG tablet, Take 1 tablet by mouth Daily., Disp: 1 tablet, Rfl: 0  •  nystatin (MYCOSTATIN) 181087 UNIT/GM powder, Apply  topically to the appropriate area as directed 2 (Two) Times a Day., Disp: 60 g, Rfl: 5  •  Apytzi-HaDaz-Eivnj-FA-DHA w/oA (VITAPEARL) 30-1.4-200 MG capsule controlled-release, Take 1 capsule by mouth Daily., Disp: 90 capsule, Rfl: 3    ALLERGIES     Patient has no known allergies.    FAMILY HISTORY       Family History   Problem Relation Age of Onset   • Diabetes Mother    • No Known Problems Father    • No Known Problems Daughter    • Hypertension Maternal Uncle    • Hypertension Maternal Grandmother    • Hypertension Maternal Grandfather           SOCIAL HISTORY       Social History     Socioeconomic History   • Marital status:    Tobacco Use   • Smoking status: Never Smoker   • Smokeless tobacco: Never Used   Vaping Use   • Vaping Use: Never used   Substance and Sexual Activity   • Alcohol use: No   • Drug use: No   • Sexual activity: Defer     Partners: Male     Birth control/protection: None         PHYSICAL EXAM    (up to 7 for level 4, 8 or more for level 5)     Vitals:    11/30/21 1751 11/30/21 2130 11/30/21 2215   BP: 112/71 102/55 112/69   BP Location: Left arm     Patient Position: Sitting     Pulse: 84     Resp: 16     Temp: 97.9 °F (36.6 °C)     TempSrc: Oral     SpO2: 98% 96% 96%   Weight: 79.4 kg (175 lb)     Height: 170.2 cm (67\")         Physical Exam  General : Patient is " awake, alert, oriented, in no acute distress, nontoxic appearing  HEENT: Pupils are equally round and reactive to light, EOMI, conjunctivae clear, sclerae white, there is no injection no icterus.  Oral mucosa is moist, no exudate. Uvula is midline  Neck: Neck is supple, full range of motion, trachea midline  Cardiac: Heart regular rate, rhythm, no murmurs, rubs, or gallops  Lungs: Lungs are clear to auscultation, there is no wheezing, rhonchi, or rales. There is no use of accessory muscles  Chest wall: There is no tenderness to palpation over the chest wall or over ribs  Abdomen: Abdomen is soft, nontender, nondistended. Positive gravid uterus, no tenderness to palpation. There are no firm or pulsatile masses, no rebound rigidity or guarding.   Musculoskeletal: 5 out of 5 strength in all 4 extremities.  No focal muscle deficits are appreciated  Neuro: Motor intact, sensory intact, level of consciousness is normal  Dermatology: Skin is warm and dry  Psych: Mentation is grossly normal, cognition is grossly normal. Affect is appropriate.      DIAGNOSTIC RESULTS     EKG: All EKGs are interpreted by the Emergency Department Physician who either signs or Co-signs this chart in the absence of a cardiologist.    ECG 12 Lead   Final Result   Test Reason : SOA Protocol   Blood Pressure :   */*   mmHG   Vent. Rate :  77 BPM     Atrial Rate :  77 BPM      P-R Int : 134 ms          QRS Dur :  76 ms       QT Int : 348 ms       P-R-T Axes :  24  -5  26 degrees      QTc Int : 393 ms      Normal sinus rhythm   Normal ECG   No previous ECGs available   Confirmed by LOBITO DIANA MD ([id number]) on 11/30/2021 10:53:59 PM      Referred By:            Confirmed By: LOBITO DIANA MD          RADIOLOGY:   Non-plain film images such as CT, Ultrasound and MRI are read by the radiologist. Plain radiographic images are visualized and preliminarily interpreted by the emergency physician with the below findings:      [] Radiologist's Report  Reviewed:  XR Chest 1 View   Final Result   Low lung volumes with streaky perihilar opacities that may be bronchovascular crowding or atelectasis. Infiltrate felt less likely. Correlate clinically.             Signer Name: Sherry Fischer MD    Signed: 11/30/2021 10:46 PM    Workstation Name: [id number]     [facility]            ED BEDSIDE ULTRASOUND:   Performed by ED Physician - none    LABS:    I have reviewed and interpreted all of the currently available lab results from this visit (if applicable):  Results for orders placed or performed during the hospital encounter of 11/30/21   Comprehensive Metabolic Panel    Specimen: Blood   Result Value Ref Range    Glucose 104 (H) 65 - 99 mg/dL    BUN 6 6 - 20 mg/dL    Creatinine 0.42 (L) 0.57 - 1.00 mg/dL    Sodium 140 136 - 145 mmol/L    Potassium 4.0 3.5 - 5.2 mmol/L    Chloride 106 98 - 107 mmol/L    CO2 22.0 22.0 - 29.0 mmol/L    Calcium 9.9 8.6 - 10.5 mg/dL    Total Protein 6.8 6.0 - 8.5 g/dL    Albumin 3.40 (L) 3.50 - 5.20 g/dL    ALT (SGPT) 6 1 - 33 U/L    AST (SGOT) 17 1 - 32 U/L    Alkaline Phosphatase 103 39 - 117 U/L    Total Bilirubin 0.3 0.0 - 1.2 mg/dL    eGFR Non African Amer >150 >60 mL/min/1.73    Globulin 3.4 gm/dL    A/G Ratio 1.0 g/dL    BUN/Creatinine Ratio 14.3 7.0 - 25.0    Anion Gap 12.0 5.0 - 15.0 mmol/L   BNP    Specimen: Blood   Result Value Ref Range    proBNP 25.6 0.0 - 450.0 pg/mL   Troponin    Specimen: Blood   Result Value Ref Range    Troponin T <0.010 0.000 - 0.030 ng/mL   CBC Auto Differential    Specimen: Blood   Result Value Ref Range    WBC 6.17 3.40 - 10.80 10*3/mm3    RBC 3.36 (L) 3.77 - 5.28 10*6/mm3    Hemoglobin 10.8 (L) 12.0 - 15.9 g/dL    Hematocrit 30.8 (L) 34.0 - 46.6 %    MCV 91.7 79.0 - 97.0 fL    MCH 32.1 26.6 - 33.0 pg    MCHC 35.1 31.5 - 35.7 g/dL    RDW 12.0 (L) 12.3 - 15.4 %    RDW-SD 40.4 37.0 - 54.0 fl    MPV 9.7 6.0 - 12.0 fL    Platelets 225 140 - 450 10*3/mm3    Neutrophil % 63.1 42.7  "- 76.0 %    Lymphocyte % 24.3 19.6 - 45.3 %    Monocyte % 6.6 5.0 - 12.0 %    Eosinophil % 5.2 0.3 - 6.2 %    Basophil % 0.2 0.0 - 1.5 %    Immature Grans % 0.6 (H) 0.0 - 0.5 %    Neutrophils, Absolute 3.89 1.70 - 7.00 10*3/mm3    Lymphocytes, Absolute 1.50 0.70 - 3.10 10*3/mm3    Monocytes, Absolute 0.41 0.10 - 0.90 10*3/mm3    Eosinophils, Absolute 0.32 0.00 - 0.40 10*3/mm3    Basophils, Absolute 0.01 0.00 - 0.20 10*3/mm3    Immature Grans, Absolute 0.04 0.00 - 0.05 10*3/mm3    nRBC 0.0 0.0 - 0.2 /100 WBC   ECG 12 Lead   Result Value Ref Range    QT Interval 348 ms    QTC Interval 393 ms   Green Top (Gel)   Result Value Ref Range    Extra Tube Hold for add-ons.    Lavender Top   Result Value Ref Range    Extra Tube hold for add-on    Gold Top - SST   Result Value Ref Range    Extra Tube Hold for add-ons.    Gray Top   Result Value Ref Range    Extra Tube Hold for add-ons.    Light Blue Top   Result Value Ref Range    Extra Tube hold for add-on         All other labs were within normal range or not returned as of this dictation.      EMERGENCY DEPARTMENT COURSE and DIFFERENTIAL DIAGNOSIS/MDM:   Vitals:    Vitals:    11/30/21 1751 11/30/21 2130 11/30/21 2215   BP: 112/71 102/55 112/69   BP Location: Left arm     Patient Position: Sitting     Pulse: 84     Resp: 16     Temp: 97.9 °F (36.6 °C)     TempSrc: Oral     SpO2: 98% 96% 96%   Weight: 79.4 kg (175 lb)     Height: 170.2 cm (67\")              Patient who is currently in her third trimester pregnancy who had a recent Covid infection who presents with cough congestion mild intermittent chest pain. She is nontoxic-appearing, her vital signs are stable during my evaluation. She is not hypoxic, no acute respiratory distress. She did obtain IV, labs and imaging for further evaluation with results reviewed as above. Symptoms are consistent with underlying viral type process likely from her recent Covid infection. We discussed continuing with a symptomatic therapies, " following closely with her PCP and OB/GYN for continued care and management.  Patient updated the plan of care, she is in agreement, return precautions discussed.  She is nontoxic-appearing.  She is comfortable with the following up as an outpatient.  Return precautions discussed. I had a discussion with the patient/family regarding diagnosis, diagnostic results, treatment plan, and medications.  The patient/family indicated understanding of these instructions.  I spent adequate time at the bedside preceding discharge necessary to personally discuss the aftercare instructions, giving patient education, providing explanations of the results of our evaluations/findings, and my decision making to assure that the patient/family understand the plan of care.  Time was allotted to answer questions at that time and throughout the ED course.  Emphasis was placed on timely follow-up after discharge.  I also discussed the potential for the development of an acute emergent condition requiring further evaluation, admission, or even surgical intervention. I discussed that we found nothing during the visit today indicating the need for further workup, admission, or the presence of an unstable medical condition.  I encouraged the patient to return to the emergency department immediately for ANY concerns, worsening, new complaints, or if symptoms persist and unable to seek follow-up in a timely fashion.  The patient/family expressed understanding and agreement with this plan.  The patient will follow-up with their PCP in 1-2 days for reevaluation.       MEDICATIONS ADMINISTERED IN ED:  Medications   sodium chloride 0.9 % flush 10 mL (has no administration in time range)       PROCEDURES:  Procedures    CRITICAL CARE TIME    Total Critical Care time was 0 minutes, excluding separately reportable procedures.   There was a high probability of clinically significant/life threatening deterioration in the patient's condition which  required my urgent intervention.      FINAL IMPRESSION      1. COVID-19 virus infection    2. Chest pain, unspecified type    3. Cough          DISPOSITION/PLAN     ED Disposition     ED Disposition Condition Comment    Discharge Stable           PATIENT REFERRED TO:  PATIENT CONNECTION - [place] [id number]  [phone]  Schedule an appointment as soon as possible for a visit in 2 days      [facility] Emergency Department  [address] [facility] [address] [phone]    If symptoms worsen      DISCHARGE MEDICATIONS:     Medication List      START taking these medications    acetaminophen 500 MG tablet  Commonly known as: TYLENOL  Take 1 tablet by mouth Every 4 (Four) Hours As Needed for Mild Pain  or Fever.        CONTINUE taking these medications    ferrous sulfate 325 (65 FE) MG tablet  Take 1 tablet by mouth 2 (Two) Times a Day Before Meals.     fluconazole 150 MG tablet  Commonly known as: Diflucan  Take 1 tablet by mouth Daily.     nystatin 563879 UNIT/GM powder  Commonly known as: MYCOSTATIN  Apply  topically to the appropriate area as directed 2 (Two) Times a Day.     VitaPearl 30-1.4-200 MG capsule controlled-release  Take 1 capsule by mouth Daily.           Where to Get Your Medications      These medications were sent to [place], [place] - [address] [facility] - [phone]  - [phone] [address] [address]    Phone: [phone]   · acetaminophen 500 MG tablet             Comment: Please note this report has been produced using speech recognition software.      Brett Ordaz DO  Attending Emergency Physician               Brett Ordaz DO  11/30/21 0952

## 2021-12-02 ENCOUNTER — TELEPHONE (OUTPATIENT)
Dept: OBSTETRICS AND GYNECOLOGY | Facility: CLINIC | Age: 26
End: 2021-12-02

## 2021-12-02 ENCOUNTER — LAB (OUTPATIENT)
Dept: LAB | Facility: HOSPITAL | Age: 26
End: 2021-12-02

## 2021-12-02 DIAGNOSIS — Z34.92 PRENATAL CARE IN SECOND TRIMESTER: ICD-10-CM

## 2021-12-02 DIAGNOSIS — L29.9 SEVERE ITCHING: ICD-10-CM

## 2021-12-02 DIAGNOSIS — L29.9 SEVERE ITCHING: Primary | ICD-10-CM

## 2021-12-02 LAB
ALBUMIN SERPL-MCNC: 3.4 G/DL (ref 3.5–5.2)
ALBUMIN/GLOB SERPL: 1.2 G/DL
ALP SERPL-CCNC: 92 U/L (ref 39–117)
ALT SERPL W P-5'-P-CCNC: 11 U/L (ref 1–33)
ANION GAP SERPL CALCULATED.3IONS-SCNC: 5.7 MMOL/L (ref 5–15)
AST SERPL-CCNC: 17 U/L (ref 1–32)
BILIRUB SERPL-MCNC: 0.2 MG/DL (ref 0–1.2)
BUN SERPL-MCNC: 9 MG/DL (ref 6–20)
BUN/CREAT SERPL: 19.1 (ref 7–25)
CALCIUM SPEC-SCNC: 8.8 MG/DL (ref 8.6–10.5)
CHLORIDE SERPL-SCNC: 105 MMOL/L (ref 98–107)
CO2 SERPL-SCNC: 25.3 MMOL/L (ref 22–29)
CREAT SERPL-MCNC: 0.47 MG/DL (ref 0.57–1)
GFR SERPL CREATININE-BSD FRML MDRD: >150 ML/MIN/1.73
GLOBULIN UR ELPH-MCNC: 2.8 GM/DL
GLUCOSE SERPL-MCNC: 83 MG/DL (ref 65–99)
POTASSIUM SERPL-SCNC: 3.9 MMOL/L (ref 3.5–5.2)
PROT SERPL-MCNC: 6.2 G/DL (ref 6–8.5)
SODIUM SERPL-SCNC: 136 MMOL/L (ref 136–145)

## 2021-12-02 PROCEDURE — 82239 BILE ACIDS TOTAL: CPT

## 2021-12-02 PROCEDURE — 36415 COLL VENOUS BLD VENIPUNCTURE: CPT

## 2021-12-02 PROCEDURE — 80053 COMPREHEN METABOLIC PANEL: CPT

## 2021-12-02 NOTE — TELEPHONE ENCOUNTER
Patient called and advised she has itching for most of her pregnancy but past few days had been very severe and is concerned about cholestasis. Patient has appointment on Monday but wanted to see if she can go ahead and get labs done?

## 2021-12-06 ENCOUNTER — ROUTINE PRENATAL (OUTPATIENT)
Dept: OBSTETRICS AND GYNECOLOGY | Facility: CLINIC | Age: 26
End: 2021-12-06

## 2021-12-06 VITALS — WEIGHT: 171 LBS | SYSTOLIC BLOOD PRESSURE: 114 MMHG | BODY MASS INDEX: 26.78 KG/M2 | DIASTOLIC BLOOD PRESSURE: 68 MMHG

## 2021-12-06 DIAGNOSIS — D50.9 IRON DEFICIENCY ANEMIA DURING PREGNANCY: ICD-10-CM

## 2021-12-06 DIAGNOSIS — O26.899 RH NEGATIVE, ANTEPARTUM: ICD-10-CM

## 2021-12-06 DIAGNOSIS — O99.019 IRON DEFICIENCY ANEMIA DURING PREGNANCY: ICD-10-CM

## 2021-12-06 DIAGNOSIS — L29.9 SEVERE ITCHING: Primary | ICD-10-CM

## 2021-12-06 DIAGNOSIS — Z67.91 RH NEGATIVE, ANTEPARTUM: ICD-10-CM

## 2021-12-06 LAB — BILE AC SERPL-SCNC: 2.6 UMOL/L (ref 0–10)

## 2021-12-06 PROCEDURE — 96372 THER/PROPH/DIAG INJ SC/IM: CPT | Performed by: NURSE PRACTITIONER

## 2021-12-06 PROCEDURE — 0502F SUBSEQUENT PRENATAL CARE: CPT | Performed by: NURSE PRACTITIONER

## 2021-12-06 RX ORDER — NYSTATIN 100000 U/G
CREAM TOPICAL SEE ADMIN INSTRUCTIONS
COMMUNITY
Start: 2021-11-09 | End: 2022-02-11 | Stop reason: HOSPADM

## 2021-12-06 NOTE — PROGRESS NOTES
[id number]  Chief Complaint   Patient presents with   • Routine Prenatal Visit      No Complaints/concerns         HPI  Camille is a  currently at 30w0d who today reports the following:     Has had COVID - feeling better now   Reports still itching all over - especially at night   Does have constipation   Good FM          EXAM  /68   Wt 77.6 kg (171 lb)   LMP 05/10/2021   BMI 26.78 kg/m²  -See Prenatal Assessment  General Appearance:  Pleasant  Lungs: Breathing unlabored  Abdomen:  See flow sheet for Fundal ht, FM, FHT's  LE: Neg edema  V/E: Not performed     Social History     Tobacco Use   • Smoking status: Never Smoker   • Smokeless tobacco: Never Used   Vaping Use   • Vaping Use: Never used   Substance Use Topics   • Alcohol use: No   • Drug use: No         Lab Results   Component Value Date    ABO O 2021    RH Negative 2021    ABSCRN Negative 2021       MDM  Impression: Pregnancy with active problem(s) &/or complication(s)   Recent COVID virus / doing well now   Pruritis - S/S cholecystitis / bile salts pending   RH neg   Anemia  Constipation    Tests done today: none   Topics discussed: continue to note good FM /kick counts  Will check on bile salts    Anemia / iron / foods high in iron  miralax   Rhogam   Flu vaccination  Nutrition reviewed   s/s PTL  encouraged questions - call prn    Tests next visit: U/S

## 2021-12-07 NOTE — PROGRESS NOTES
Please let patient know her blood work is normal.  It did not indicate cholestasis at this time.  Thank you

## 2021-12-20 ENCOUNTER — ROUTINE PRENATAL (OUTPATIENT)
Dept: OBSTETRICS AND GYNECOLOGY | Facility: CLINIC | Age: 26
End: 2021-12-20

## 2021-12-20 VITALS — SYSTOLIC BLOOD PRESSURE: 114 MMHG | BODY MASS INDEX: 27.25 KG/M2 | DIASTOLIC BLOOD PRESSURE: 58 MMHG | WEIGHT: 174 LBS

## 2021-12-20 DIAGNOSIS — O26.899 RH NEGATIVE, ANTEPARTUM: ICD-10-CM

## 2021-12-20 DIAGNOSIS — R21 RASH: ICD-10-CM

## 2021-12-20 DIAGNOSIS — K21.9 GASTROESOPHAGEAL REFLUX DISEASE WITHOUT ESOPHAGITIS: ICD-10-CM

## 2021-12-20 DIAGNOSIS — Z36.89 ENCOUNTER FOR ULTRASOUND TO ASSESS FETAL GROWTH: ICD-10-CM

## 2021-12-20 DIAGNOSIS — D50.9 IRON DEFICIENCY ANEMIA DURING PREGNANCY: ICD-10-CM

## 2021-12-20 DIAGNOSIS — O99.019 IRON DEFICIENCY ANEMIA DURING PREGNANCY: ICD-10-CM

## 2021-12-20 DIAGNOSIS — Z67.91 RH NEGATIVE, ANTEPARTUM: ICD-10-CM

## 2021-12-20 DIAGNOSIS — Z30.2 REQUEST FOR STERILIZATION: ICD-10-CM

## 2021-12-20 DIAGNOSIS — O09.93 ENCOUNTER FOR SUPERVISION OF HIGH RISK PREGNANCY IN THIRD TRIMESTER, ANTEPARTUM: Primary | ICD-10-CM

## 2021-12-20 DIAGNOSIS — O09.899 SHORT INTERVAL BETWEEN PREGNANCIES AFFECTING PREGNANCY, ANTEPARTUM: ICD-10-CM

## 2021-12-20 PROCEDURE — 0502F SUBSEQUENT PRENATAL CARE: CPT | Performed by: OBSTETRICS & GYNECOLOGY

## 2021-12-20 RX ORDER — FAMOTIDINE 20 MG/1
20 TABLET, FILM COATED ORAL DAILY
Qty: 30 TABLET | Refills: 1 | Status: SHIPPED | OUTPATIENT
Start: 2021-12-20 | End: 2022-04-21

## 2021-12-20 NOTE — PROGRESS NOTES
Chief Complaint  Routine Prenatal Visit (Growth scan today, no complaints. )    History of Present Illness:  Camille is a  currently at 32w0d who presents today with no complaints other than continued intense itching.  Patient does report now having a rash on her abdomen.  Patient has had previous stomach acids checked.  Patient reports good fetal movement.  Patient denies any cramping or contractions.  Patient has been taking her prenatal vitamins and iron supplements.  Patient does report having reflux.  Patient has not been on any medications for her reflux.    Exam:  Vitals:  See prenatal flowsheet as noted and reviewed  General: Alert, cooperative, and does not appear in any distress  Abdomen:   See prenatal flowsheet as noted and reviewed    Uterus gravid, non-tender; no palpable masses    No guarding or rebound tenderness    Fine macular rash on abdomen predominately along striae  Pelvic:  See prenatal flowsheet as noted and reviewed  Ext:  See prenatal flowsheet as noted and reviewed    Moves extremities well, no cyanosis and no redness  Urine:  See prenatal flowsheet as noted and reviewed    Data Review:  The following data was reviewed by: Tatiana Gray MD on 2021:  Prenatal Labs:  Lab Results   Component Value Date    HGB 10.8 (L) 2021    RUBELLAABIGG 1.30 2021    HEPBSAG Negative 2021    ABO O 2021    RH Negative 2021    ABSCRN Negative 2021    QJG2GIS1 Non Reactive 2021    HEPCVIRUSABY <0.1 2021    STREPGPB Negative 2020    URINECX Final report 2018       Lab on 2021   Component Date Value   • Bile Acids Total 2021 2.6    • Glucose 2021 83    • BUN 2021 9    • Creatinine 2021 0.47*   • Sodium 2021 136    • Potassium 2021 3.9    • Chloride 2021 105    • CO2 2021 25.3    • Calcium 2021 8.8    • Total Protein 2021 6.2    • Albumin 2021 3.40*   • ALT (SGPT)  12/02/2021 11    • AST (SGOT) 12/02/2021 17    • Alkaline Phosphatase 12/02/2021 92    • Total Bilirubin 12/02/2021 0.2    • eGFR Non  Amer 12/02/2021 >150    • Globulin 12/02/2021 2.8    • A/G Ratio 12/02/2021 1.2    • BUN/Creatinine Ratio 12/02/2021 19.1    • Anion Gap 12/02/2021 5.7    Admission on 11/30/2021, Discharged on 11/30/2021   Component Date Value   • QT Interval 11/30/2021 348    • QTC Interval 11/30/2021 393    • Glucose 11/30/2021 104*   • BUN 11/30/2021 6    • Creatinine 11/30/2021 0.42*   • Sodium 11/30/2021 140    • Potassium 11/30/2021 4.0    • Chloride 11/30/2021 106    • CO2 11/30/2021 22.0    • Calcium 11/30/2021 9.9    • Total Protein 11/30/2021 6.8    • Albumin 11/30/2021 3.40*   • ALT (SGPT) 11/30/2021 6    • AST (SGOT) 11/30/2021 17    • Alkaline Phosphatase 11/30/2021 103    • Total Bilirubin 11/30/2021 0.3    • eGFR Non African Amer 11/30/2021 >150    • Globulin 11/30/2021 3.4    • A/G Ratio 11/30/2021 1.0    • BUN/Creatinine Ratio 11/30/2021 14.3    • Anion Gap 11/30/2021 12.0    • proBNP 11/30/2021 25.6    • Troponin T 11/30/2021 <0.010    • Extra Tube 11/30/2021 Hold for add-ons.    • Extra Tube 11/30/2021 hold for add-on    • Extra Tube 11/30/2021 Hold for add-ons.    • Extra Tube 11/30/2021 Hold for add-ons.    • Extra Tube 11/30/2021 hold for add-on    • WBC 11/30/2021 6.17    • RBC 11/30/2021 3.36*   • Hemoglobin 11/30/2021 10.8*   • Hematocrit 11/30/2021 30.8*   • MCV 11/30/2021 91.7    • MCH 11/30/2021 32.1    • MCHC 11/30/2021 35.1    • RDW 11/30/2021 12.0*   • RDW-SD 11/30/2021 40.4    • MPV 11/30/2021 9.7    • Platelets 11/30/2021 225    • Neutrophil % 11/30/2021 63.1    • Lymphocyte % 11/30/2021 24.3    • Monocyte % 11/30/2021 6.6    • Eosinophil % 11/30/2021 5.2    • Basophil % 11/30/2021 0.2    • Immature Grans % 11/30/2021 0.6*   • Neutrophils, Absolute 11/30/2021 3.89    • Lymphocytes, Absolute 11/30/2021 1.50    • Monocytes, Absolute 11/30/2021 0.41    •  Eosinophils, Absolute 2021 0.32    • Basophils, Absolute 2021 0.01    • Immature Grans, Absolute 2021 0.04    • nRBC 2021 0.0      Imaging:  US Ob Follow Up Transabdominal Approach  Camille Still  : 1995  MRN: [id number]  Date: 2021    Reason for exam/History:  Growth    Ultrasound images are reviewed.  There is noted to be a viable   intrauterine pregnancy. The pregnancy is measuring 32 weeks 3 days   gestation.  The estimated fetal weight is 1981 grams at the 54.3 % for   growth.  The HC was at the 51.2 % and the AC was at the 66.7 %.  The   amniotic fluid index was 13.09 cms.  The fetal heart rate was normal.     The infant was in the breech presentation.  The placental location was   noted to be anterior.      The exam limitations noted:  none    See ultrasound report for measurements and structures identified.    Tatiana Gray MD, [facility]  OB GYN [place]    Medical Records:  None    Assessment and Plan:  Problem List Items Addressed This Visit        Genitourinary and Reproductive     Request for sterilization  Patient is desiring permanent surgical sterilization.  Patient has been counseled regarding the risks, complications, benefits, as well as other alternatives.       Gravid and     Rh negative status during pregnancy  Patient has received RhoGam as noted.    Short interval between pregnancies affecting pregnancy, antepartum      Other Visit Diagnoses     Encounter for supervision of high risk pregnancy in third trimester, antepartum    -  Primary  Topics discussed:     GERD management  kick counts and fetal movement  PIH precautions   labor signs and symptoms  tubal risks, benefits  Scan today as noted  Labs as noted    Relevant Orders    Bile Acids, Total    Comprehensive Metabolic Panel    Encounter for ultrasound to assess fetal growth      Patient has been informed regarding those findings    Relevant Orders    US  Ob Follow Up Transabdominal Approach (Completed)    Iron deficiency anemia during pregnancy      Patient is to continue her iron supplements    Rash      Patient with rash on her abdomen consistent with PUPPS.  We will check bile acids and liver function test today as noted.  Prescription is given for H2 blocker for management of her pruritus.  Patient is also taking an antihistamine as discussed.  Instructions and precautions have been given.  Patient is to call if no improvement in her symptoms.    Relevant Orders    Bile Acids, Total    Comprehensive Metabolic Panel    Gastroesophageal reflux disease without esophagitis        Relevant Medications    famotidine (Pepcid) 20 MG tablet        Follow Up/Instructions:  Follow up as scheduled.  Patient was given instructions and counseling regarding her condition or for health maintenance advice. Please see specific information pulled into the AVS if appropriate.     Note: Speech recognition transcription software may have been used to dictate portions of this document.  An attempt at proofreading has been made though minor errors in transcription may still be present.    This note was electronically signed.  Tatiana Gray M.D.

## 2021-12-21 LAB
ALBUMIN SERPL-MCNC: 3.3 G/DL (ref 3.9–5)
ALBUMIN/GLOB SERPL: 1.6 {RATIO} (ref 1.2–2.2)
ALP SERPL-CCNC: 78 IU/L (ref 44–121)
ALT SERPL-CCNC: 9 IU/L (ref 0–32)
AST SERPL-CCNC: 16 IU/L (ref 0–40)
BILE AC SERPL-SCNC: 1 UMOL/L (ref 0–10)
BILIRUB SERPL-MCNC: <0.2 MG/DL (ref 0–1.2)
BUN SERPL-MCNC: 5 MG/DL (ref 6–20)
BUN/CREAT SERPL: 10 (ref 9–23)
CALCIUM SERPL-MCNC: 8.5 MG/DL (ref 8.7–10.2)
CHLORIDE SERPL-SCNC: 107 MMOL/L (ref 96–106)
CO2 SERPL-SCNC: 19 MMOL/L (ref 20–29)
CREAT SERPL-MCNC: 0.5 MG/DL (ref 0.57–1)
GLOBULIN SER CALC-MCNC: 2.1 G/DL (ref 1.5–4.5)
GLUCOSE SERPL-MCNC: 84 MG/DL (ref 65–99)
POTASSIUM SERPL-SCNC: 4 MMOL/L (ref 3.5–5.2)
PROT SERPL-MCNC: 5.4 G/DL (ref 6–8.5)
SODIUM SERPL-SCNC: 139 MMOL/L (ref 134–144)

## 2022-01-03 ENCOUNTER — ROUTINE PRENATAL (OUTPATIENT)
Dept: OBSTETRICS AND GYNECOLOGY | Facility: CLINIC | Age: 27
End: 2022-01-03

## 2022-01-03 VITALS — BODY MASS INDEX: 27.1 KG/M2 | WEIGHT: 173 LBS | DIASTOLIC BLOOD PRESSURE: 62 MMHG | SYSTOLIC BLOOD PRESSURE: 122 MMHG

## 2022-01-03 DIAGNOSIS — Z34.93 THIRD TRIMESTER PREGNANCY: Primary | ICD-10-CM

## 2022-01-03 PROCEDURE — 0502F SUBSEQUENT PRENATAL CARE: CPT | Performed by: OBSTETRICS & GYNECOLOGY

## 2022-01-03 RX ORDER — CLOTRIMAZOLE AND BETAMETHASONE DIPROPIONATE 10; .64 MG/G; MG/G
1 CREAM TOPICAL 2 TIMES DAILY
Qty: 1 EACH | Refills: 0 | Status: SHIPPED | OUTPATIENT
Start: 2022-01-03 | End: 2022-04-21

## 2022-01-03 NOTE — PROGRESS NOTES
Chief Complaint   Patient presents with   • Routine Prenatal Visit     Patient states she doing well        HPI:   , 34w0d gestation reports doing well    ROS:  See Prenatal Episode/Flowsheet  /62   Wt 78.5 kg (173 lb)   LMP 05/10/2021   BMI 27.10 kg/m²      EXAM:  EXTREMITIES:  No swelling-See Prenatal Episode/Flowsheet    ABDOMEN:  FHTs/Movement noted-See Prenatal Episode/Flowsheet    URINE GLUCOSE/PROTEIN:  See Prenatal Episode/Flowsheet    PELVIC EXAM:  See Prenatal Episode/Flowsheet  CV:  Lungs:  GYN:    MDM:    Lab Results   Component Value Date    HGB 10.8 (L) 2021    RUBELLAABIGG 1.30 2021    HEPBSAG Negative 2021    ABO O 2021    RH Negative 2021    ABSCRN Negative 2021    [id number] Non Reactive 2021    HEPCVIRUSABY <0.1 2021    STREPGPB Negative 2020    URINECX Final report 2018       U/S:US Ob Follow Up Transabdominal Approach (2021 11:17)      1. IUP 34w0d  2. Routine care   3. Vertex  4. Intertrig yeast: nystatin did not help- Lotrisone cream BID x 14 days  5. Anemia: taking FE and PNV  6. BTL: papers signed

## 2022-01-24 ENCOUNTER — ROUTINE PRENATAL (OUTPATIENT)
Dept: OBSTETRICS AND GYNECOLOGY | Facility: CLINIC | Age: 27
End: 2022-01-24

## 2022-01-24 VITALS — SYSTOLIC BLOOD PRESSURE: 120 MMHG | WEIGHT: 175 LBS | DIASTOLIC BLOOD PRESSURE: 60 MMHG | BODY MASS INDEX: 27.41 KG/M2

## 2022-01-24 DIAGNOSIS — Z67.91 RH NEGATIVE, ANTEPARTUM: ICD-10-CM

## 2022-01-24 DIAGNOSIS — Z36.85 ANTENATAL SCREENING FOR STREPTOCOCCUS B: ICD-10-CM

## 2022-01-24 DIAGNOSIS — O99.019 IRON DEFICIENCY ANEMIA DURING PREGNANCY: ICD-10-CM

## 2022-01-24 DIAGNOSIS — O09.899 SHORT INTERVAL BETWEEN PREGNANCIES AFFECTING PREGNANCY, ANTEPARTUM: ICD-10-CM

## 2022-01-24 DIAGNOSIS — O09.93 ENCOUNTER FOR SUPERVISION OF HIGH RISK PREGNANCY IN THIRD TRIMESTER, ANTEPARTUM: Primary | ICD-10-CM

## 2022-01-24 DIAGNOSIS — D50.9 IRON DEFICIENCY ANEMIA DURING PREGNANCY: ICD-10-CM

## 2022-01-24 DIAGNOSIS — O26.899 RH NEGATIVE, ANTEPARTUM: ICD-10-CM

## 2022-01-24 DIAGNOSIS — K21.9 GASTROESOPHAGEAL REFLUX DISEASE WITHOUT ESOPHAGITIS: ICD-10-CM

## 2022-01-24 PROCEDURE — 0502F SUBSEQUENT PRENATAL CARE: CPT | Performed by: OBSTETRICS & GYNECOLOGY

## 2022-01-25 NOTE — PROGRESS NOTES
Chief Complaint  Routine Prenatal Visit (GBS done, patient wants to discuss tubal. )    History of Present Illness:  Camille is a  currently at 37w1d who presents today with no complaints.  Patient reports having occasional contractions.  Patient does report good fetal movement.  Patient is taking her Pepcid.  Patient is taking her prenatal vitamins and iron supplements.  Patient is uncertain regarding her tubal ligation.  Patient has reservations regarding her tubal ligation would like to consider other alternatives for contraception.    Exam:  Vitals:  See prenatal flowsheet as noted and reviewed  General: Alert, cooperative, and does not appear in any distress  Abdomen:   See prenatal flowsheet as noted and reviewed    Uterus gravid, non-tender; no palpable masses    No guarding or rebound tenderness  Pelvic:  See prenatal flowsheet as noted and reviewed  Ext:  See prenatal flowsheet as noted and reviewed    Moves extremities well, no cyanosis and no redness  Urine:  See prenatal flowsheet as noted and reviewed    Data Review:  The following data was reviewed by: Tatiana Gray MD on 2022:  Prenatal Labs:  Lab Results   Component Value Date    HGB 10.8 (L) 2021    RUBELLAABIGG 1.30 2021    HEPBSAG Negative 2021    ABO O 2021    RH Negative 2021    ABSCRN Negative 2021    [id number] Non Reactive 2021    HEPCVIRUSABY <0.1 2021    STREPGPB Negative 2020    URINECX Final report 2018       No visits with results within 1 Month(s) from this visit.   Latest known visit with results is:   Routine Prenatal on 2021   Component Date Value   • Bile Acids Total 2021 1.0    • Glucose 2021 84    • BUN 2021 5*   • Creatinine 2021 0.50*   • eGFR Non African Am 2021 132    • eGFR  Am 2021 152    • BUN/Creatinine Ratio 2021 10    • Sodium 2021 139    • Potassium 2021 4.0    • Chloride 2021  107*   • Total CO2 2021 19*   • Calcium 2021 8.5*   • Total Protein 2021 5.4*   • Albumin 2021 3.3*   • Globulin 2021 2.1    • A/G Ratio 2021 1.6    • Total Bilirubin 2021 <0.2    • Alkaline Phosphatase 2021 78    • AST (SGOT) 2021 16    • ALT (SGPT) 2021 9      Imaging:  US Ob Follow Up Transabdominal Approach  Camille Still  : 1995  MRN: [id number]  Date: 2021    Reason for exam/History:  Growth    Ultrasound images are reviewed.  There is noted to be a viable   intrauterine pregnancy. The pregnancy is measuring 32 weeks 3 days   gestation.  The estimated fetal weight is 1981 grams at the 54.3 % for   growth.  The HC was at the 51.2 % and the AC was at the 66.7 %.  The   amniotic fluid index was 13.09 cms.  The fetal heart rate was normal.     The infant was in the breech presentation.  The placental location was   noted to be anterior.      The exam limitations noted:  none    See ultrasound report for measurements and structures identified.    Tatiana Gray MD, [facility]  OB GYN [place]    Medical Records:  None    Assessment and Plan:  Problem List Items Addressed This Visit        Gravid and     Rh negative status during pregnancy    Short interval between pregnancies affecting pregnancy, antepartum      Other Visit Diagnoses     Encounter for supervision of high risk pregnancy in third trimester, antepartum    -  Primary  Topics discussed:     GERD management  iron supplementation  kick counts and fetal movement  PIH precautions   labor signs and symptoms  tubal risks, benefits -I have discussed with the patient the risk versus benefits of her tubal ligation.  I have also discussed with the patient other alternatives for contraception including LARCs.  Given patient has reservation I recommend patient do not proceed with her tubal ligation.  Patient is to consider Mirena IUD.        screening for streptococcus B      GBS obtained as noted    Relevant Orders    Strep Grp B ALEJA + Reflex - Swab, Vaginal/Rectum    Iron deficiency anemia during pregnancy      Patient is to continue her iron supplements.    Gastroesophageal reflux disease without esophagitis      Pt to continue her Pepcid as previously given.        Follow Up/Instructions:  Follow up as scheduled.  Patient was given instructions and counseling regarding her condition or for health maintenance advice. Please see specific information pulled into the AVS if appropriate.     Note: Speech recognition transcription software may have been used to dictate portions of this document.  An attempt at proofreading has been made though minor errors in transcription may still be present.    This note was electronically signed.  Tatiana Gray M.D.

## 2022-01-26 LAB — GP B STREP DNA SPEC QL NAA+PROBE: NEGATIVE

## 2022-01-31 ENCOUNTER — ROUTINE PRENATAL (OUTPATIENT)
Dept: OBSTETRICS AND GYNECOLOGY | Facility: CLINIC | Age: 27
End: 2022-01-31

## 2022-01-31 VITALS — DIASTOLIC BLOOD PRESSURE: 70 MMHG | BODY MASS INDEX: 27.25 KG/M2 | WEIGHT: 174 LBS | SYSTOLIC BLOOD PRESSURE: 98 MMHG

## 2022-01-31 DIAGNOSIS — Z67.91 RH NEGATIVE STATUS DURING PREGNANCY IN THIRD TRIMESTER: Primary | ICD-10-CM

## 2022-01-31 DIAGNOSIS — O26.893 RH NEGATIVE STATUS DURING PREGNANCY IN THIRD TRIMESTER: Primary | ICD-10-CM

## 2022-01-31 DIAGNOSIS — Z34.83 ENCOUNTER FOR SUPERVISION OF OTHER NORMAL PREGNANCY IN THIRD TRIMESTER: ICD-10-CM

## 2022-01-31 PROCEDURE — 0502F SUBSEQUENT PRENATAL CARE: CPT | Performed by: MIDWIFE

## 2022-01-31 NOTE — PROGRESS NOTES
Chief Complaint   Patient presents with   • Routine Prenatal Visit     No Complaints/concerns , Good fetal movement        HPI: Camille is a  currently at 38w0d who today reports the following:  Baby is active. A few random contractions. She has a hemorrhoid. She denies constipation. She is trying to take iron at least once daily.                EXAM:     Vitals:    22 1052   BP: 98/70      Abdomen:   See prenatal flowsheet as noted and reviewed, soft, nontender   Pelvic:  See prenatal flowsheet as noted and reviewed  posterior, small pink                                     hemorrhoid   Urine:  See prenatal flowsheet as noted and reviewed    Lab Results   Component Value Date    ABO O 2021    RH Negative 2021    ABSCRN Negative 2021       MDM:  Impression: Supervision of low risk pregnancy  Rh negative  Anemia in pregnancy   Tests done today: none   Topics discussed: kick counts and fetal movement  labor signs and symptoms  Prep H cream for hemorrhoids, declined Anusol supp  Reviewed OB labs   Tests next visit: none                RTO:                        1 weeks    This note was electronically signed.  Subha Arteaga, ANTHONY  2022

## 2022-02-08 ENCOUNTER — PREP FOR SURGERY (OUTPATIENT)
Dept: OTHER | Facility: HOSPITAL | Age: 27
End: 2022-02-08

## 2022-02-08 ENCOUNTER — ROUTINE PRENATAL (OUTPATIENT)
Dept: OBSTETRICS AND GYNECOLOGY | Facility: CLINIC | Age: 27
End: 2022-02-08

## 2022-02-08 VITALS — WEIGHT: 174 LBS | BODY MASS INDEX: 27.25 KG/M2 | DIASTOLIC BLOOD PRESSURE: 60 MMHG | SYSTOLIC BLOOD PRESSURE: 120 MMHG

## 2022-02-08 DIAGNOSIS — Z34.93 THIRD TRIMESTER PREGNANCY: Primary | ICD-10-CM

## 2022-02-08 DIAGNOSIS — Z34.90 ENCOUNTER FOR INDUCTION OF LABOR: Primary | ICD-10-CM

## 2022-02-08 PROCEDURE — 0502F SUBSEQUENT PRENATAL CARE: CPT | Performed by: OBSTETRICS & GYNECOLOGY

## 2022-02-08 RX ORDER — CARBOPROST TROMETHAMINE 250 UG/ML
250 INJECTION, SOLUTION INTRAMUSCULAR ONCE AS NEEDED
Status: CANCELLED | OUTPATIENT
Start: 2022-02-08 | End: 2022-02-09

## 2022-02-08 RX ORDER — SODIUM CHLORIDE 0.9 % (FLUSH) 0.9 %
10 SYRINGE (ML) INJECTION EVERY 12 HOURS SCHEDULED
Status: CANCELLED | OUTPATIENT
Start: 2022-02-08

## 2022-02-08 RX ORDER — SODIUM CHLORIDE, SODIUM LACTATE, POTASSIUM CHLORIDE, CALCIUM CHLORIDE 600; 310; 30; 20 MG/100ML; MG/100ML; MG/100ML; MG/100ML
125 INJECTION, SOLUTION INTRAVENOUS CONTINUOUS
Status: CANCELLED | OUTPATIENT
Start: 2022-02-08

## 2022-02-08 RX ORDER — MISOPROSTOL 200 UG/1
800 TABLET ORAL AS NEEDED
Status: CANCELLED | OUTPATIENT
Start: 2022-02-08

## 2022-02-08 RX ORDER — MORPHINE SULFATE 1 MG/ML
6 INJECTION, SOLUTION EPIDURAL; INTRATHECAL; INTRAVENOUS
Status: CANCELLED | OUTPATIENT
Start: 2022-02-08 | End: 2022-02-18

## 2022-02-08 RX ORDER — LIDOCAINE HYDROCHLORIDE 10 MG/ML
5 INJECTION, SOLUTION EPIDURAL; INFILTRATION; INTRACAUDAL; PERINEURAL AS NEEDED
Status: CANCELLED | OUTPATIENT
Start: 2022-02-08

## 2022-02-08 RX ORDER — PROMETHAZINE HYDROCHLORIDE 12.5 MG/1
12.5 SUPPOSITORY RECTAL EVERY 6 HOURS PRN
Status: CANCELLED | OUTPATIENT
Start: 2022-02-08

## 2022-02-08 RX ORDER — MORPHINE SULFATE 4 MG/ML
4 INJECTION, SOLUTION INTRAMUSCULAR; INTRAVENOUS EVERY 4 HOURS PRN
Status: CANCELLED | OUTPATIENT
Start: 2022-02-08 | End: 2022-02-18

## 2022-02-08 RX ORDER — SODIUM CHLORIDE 0.9 % (FLUSH) 0.9 %
1-10 SYRINGE (ML) INJECTION AS NEEDED
Status: CANCELLED | OUTPATIENT
Start: 2022-02-08

## 2022-02-08 RX ORDER — MORPHINE SULFATE 4 MG/ML
4 INJECTION, SOLUTION INTRAMUSCULAR; INTRAVENOUS ONCE AS NEEDED
Status: CANCELLED | OUTPATIENT
Start: 2022-02-08

## 2022-02-08 RX ORDER — METHYLERGONOVINE MALEATE 0.2 MG/ML
200 INJECTION INTRAVENOUS ONCE AS NEEDED
Status: CANCELLED | OUTPATIENT
Start: 2022-02-08 | End: 2022-02-09

## 2022-02-08 RX ORDER — OXYTOCIN/0.9 % SODIUM CHLORIDE 30/500 ML
85 PLASTIC BAG, INJECTION (ML) INTRAVENOUS ONCE
Status: CANCELLED | OUTPATIENT
Start: 2022-02-08 | End: 2022-02-08

## 2022-02-08 RX ORDER — PROMETHAZINE HYDROCHLORIDE 12.5 MG/1
12.5 TABLET ORAL EVERY 6 HOURS PRN
Status: CANCELLED | OUTPATIENT
Start: 2022-02-08

## 2022-02-08 RX ORDER — MORPHINE SULFATE 2 MG/ML
2 INJECTION, SOLUTION INTRAMUSCULAR; INTRAVENOUS ONCE AS NEEDED
Status: CANCELLED | OUTPATIENT
Start: 2022-02-08

## 2022-02-08 RX ORDER — ONDANSETRON 2 MG/ML
4 INJECTION INTRAMUSCULAR; INTRAVENOUS ONCE AS NEEDED
Status: CANCELLED | OUTPATIENT
Start: 2022-02-08

## 2022-02-08 RX ORDER — MAGNESIUM CARB/ALUMINUM HYDROX 105-160MG
30 TABLET,CHEWABLE ORAL ONCE
Status: CANCELLED | OUTPATIENT
Start: 2022-02-08

## 2022-02-08 RX ORDER — ONDANSETRON 4 MG/1
4 TABLET, FILM COATED ORAL ONCE AS NEEDED
Status: CANCELLED | OUTPATIENT
Start: 2022-02-08

## 2022-02-08 RX ORDER — OXYTOCIN/0.9 % SODIUM CHLORIDE 30/500 ML
2-20 PLASTIC BAG, INJECTION (ML) INTRAVENOUS
Status: CANCELLED | OUTPATIENT
Start: 2022-02-08

## 2022-02-08 RX ORDER — OXYTOCIN/0.9 % SODIUM CHLORIDE 30/500 ML
650 PLASTIC BAG, INJECTION (ML) INTRAVENOUS ONCE
Status: CANCELLED | OUTPATIENT
Start: 2022-02-08 | End: 2022-02-08

## 2022-02-08 RX ORDER — ACETAMINOPHEN 325 MG/1
650 TABLET ORAL EVERY 4 HOURS PRN
Status: CANCELLED | OUTPATIENT
Start: 2022-02-08

## 2022-02-08 RX ORDER — ACETAMINOPHEN 325 MG/1
650 TABLET ORAL ONCE AS NEEDED
Status: CANCELLED | OUTPATIENT
Start: 2022-02-08

## 2022-02-08 NOTE — PROGRESS NOTES
Chief Complaint   Patient presents with   • Routine Prenatal Visit     Patient states shes doing well        HPI:   , 39w1d gestation reports doing well    ROS:  See Prenatal Episode/Flowsheet  /60   Wt 78.9 kg (174 lb)   LMP 05/10/2021   BMI 27.25 kg/m²      EXAM:  EXTREMITIES:  No swelling-See Prenatal Episode/Flowsheet    ABDOMEN:  FHTs/Movement noted-See Prenatal Episode/Flowsheet    URINE GLUCOSE/PROTEIN:  See Prenatal Episode/Flowsheet    PELVIC EXAM:  See Prenatal Episode/Flowsheet  CV:  Lungs:  GYN:    MDM:    Lab Results   Component Value Date    HGB 10.8 (L) 2021    RUBELLAABIGG 1.30 2021    HEPBSAG Negative 2021    ABO O 2021    RH Negative 2021    ABSCRN Negative 2021    [id number] Non Reactive 2021    HEPCVIRUSABY <0.1 2021    STREPGPB Negative 2022    URINECX Final report 2018       U/S:    1. IUP 39w1d  2. Routine care   3. Cervix 2-3/75%, wants induction by Friday if undileivered

## 2022-02-10 ENCOUNTER — ANESTHESIA EVENT (OUTPATIENT)
Dept: LABOR AND DELIVERY | Facility: HOSPITAL | Age: 27
End: 2022-02-10

## 2022-02-10 ENCOUNTER — ANESTHESIA (OUTPATIENT)
Dept: LABOR AND DELIVERY | Facility: HOSPITAL | Age: 27
End: 2022-02-10

## 2022-02-10 ENCOUNTER — HOSPITAL ENCOUNTER (OUTPATIENT)
Dept: LABOR AND DELIVERY | Facility: HOSPITAL | Age: 27
Discharge: HOME | End: 2022-02-10

## 2022-02-10 ENCOUNTER — HOSPITAL ENCOUNTER (INPATIENT)
Facility: HOSPITAL | Age: 27
LOS: 1 days | Discharge: HOME | End: 2022-02-11
Attending: OBSTETRICS & GYNECOLOGY | Admitting: MIDWIFE

## 2022-02-10 DIAGNOSIS — Z34.90 ENCOUNTER FOR INDUCTION OF LABOR: ICD-10-CM

## 2022-02-10 LAB
ABO GROUP BLD: NORMAL
BASOPHILS # BLD AUTO: 0.03 10*3/MM3 (ref 0–0.2)
BASOPHILS NFR BLD AUTO: 0.4 % (ref 0–1.5)
BLD GP AB SCN SERPL QL: NEGATIVE
DEPRECATED RDW RBC AUTO: 40.7 FL (ref 37–54)
EOSINOPHIL # BLD AUTO: 0.22 10*3/MM3 (ref 0–0.4)
EOSINOPHIL NFR BLD AUTO: 3.1 % (ref 0.3–6.2)
ERYTHROCYTE [DISTWIDTH] IN BLOOD BY AUTOMATED COUNT: 12.2 % (ref 12.3–15.4)
HCT VFR BLD AUTO: 28.7 % (ref 34–46.6)
HGB BLD-MCNC: 9.8 G/DL (ref 12–15.9)
IMM GRANULOCYTES # BLD AUTO: 0.04 10*3/MM3 (ref 0–0.05)
IMM GRANULOCYTES NFR BLD AUTO: 0.6 % (ref 0–0.5)
LYMPHOCYTES # BLD AUTO: 2 10*3/MM3 (ref 0.7–3.1)
LYMPHOCYTES NFR BLD AUTO: 28.1 % (ref 19.6–45.3)
MCH RBC QN AUTO: 31.2 PG (ref 26.6–33)
MCHC RBC AUTO-ENTMCNC: 34.1 G/DL (ref 31.5–35.7)
MCV RBC AUTO: 91.4 FL (ref 79–97)
MONOCYTES # BLD AUTO: 0.46 10*3/MM3 (ref 0.1–0.9)
MONOCYTES NFR BLD AUTO: 6.5 % (ref 5–12)
NEUTROPHILS NFR BLD AUTO: 4.37 10*3/MM3 (ref 1.7–7)
NEUTROPHILS NFR BLD AUTO: 61.3 % (ref 42.7–76)
NRBC BLD AUTO-RTO: 0 /100 WBC (ref 0–0.2)
PLATELET # BLD AUTO: 178 10*3/MM3 (ref 140–450)
PMV BLD AUTO: 11.2 FL (ref 6–12)
RBC # BLD AUTO: 3.14 10*6/MM3 (ref 3.77–5.28)
RH BLD: NEGATIVE
SARS-COV-2 RNA PNL SPEC NAA+PROBE: NOT DETECTED
T&S EXPIRATION DATE: NORMAL
WBC NRBC COR # BLD: 7.12 10*3/MM3 (ref 3.4–10.8)

## 2022-02-10 PROCEDURE — 0KQM0ZZ REPAIR PERINEUM MUSCLE, OPEN APPROACH: ICD-10-PCS | Performed by: MIDWIFE

## 2022-02-10 PROCEDURE — 86920 COMPATIBILITY TEST SPIN: CPT

## 2022-02-10 PROCEDURE — 86922 COMPATIBILITY TEST ANTIGLOB: CPT

## 2022-02-10 PROCEDURE — 87635 SARS-COV-2 COVID-19 AMP PRB: CPT | Performed by: OBSTETRICS & GYNECOLOGY

## 2022-02-10 PROCEDURE — 51703 INSERT BLADDER CATH COMPLEX: CPT

## 2022-02-10 PROCEDURE — C1755 CATHETER, INTRASPINAL: HCPCS | Performed by: NURSE ANESTHETIST, CERTIFIED REGISTERED

## 2022-02-10 PROCEDURE — 85025 COMPLETE CBC W/AUTO DIFF WBC: CPT | Performed by: MIDWIFE

## 2022-02-10 PROCEDURE — 86850 RBC ANTIBODY SCREEN: CPT | Performed by: MIDWIFE

## 2022-02-10 PROCEDURE — 86901 BLOOD TYPING SEROLOGIC RH(D): CPT | Performed by: MIDWIFE

## 2022-02-10 PROCEDURE — 59410 OBSTETRICAL CARE: CPT | Performed by: MIDWIFE

## 2022-02-10 PROCEDURE — 59025 FETAL NON-STRESS TEST: CPT

## 2022-02-10 PROCEDURE — 86900 BLOOD TYPING SEROLOGIC ABO: CPT | Performed by: MIDWIFE

## 2022-02-10 RX ORDER — MISOPROSTOL 200 UG/1
800 TABLET ORAL AS NEEDED
Status: DISCONTINUED | OUTPATIENT
Start: 2022-02-10 | End: 2022-02-10 | Stop reason: HOSPADM

## 2022-02-10 RX ORDER — ACETAMINOPHEN 325 MG/1
650 TABLET ORAL ONCE AS NEEDED
Status: DISCONTINUED | OUTPATIENT
Start: 2022-02-10 | End: 2022-02-10 | Stop reason: HOSPADM

## 2022-02-10 RX ORDER — HYDROCORTISONE 25 MG/G
1 CREAM TOPICAL AS NEEDED
Status: DISCONTINUED | OUTPATIENT
Start: 2022-02-10 | End: 2022-02-11 | Stop reason: HOSPADM

## 2022-02-10 RX ORDER — DOCUSATE SODIUM 100 MG/1
100 CAPSULE, LIQUID FILLED ORAL 2 TIMES DAILY
Status: DISCONTINUED | OUTPATIENT
Start: 2022-02-10 | End: 2022-02-11 | Stop reason: HOSPADM

## 2022-02-10 RX ORDER — SODIUM CHLORIDE 0.9 % (FLUSH) 0.9 %
1-10 SYRINGE (ML) INJECTION AS NEEDED
Status: DISCONTINUED | OUTPATIENT
Start: 2022-02-10 | End: 2022-02-10

## 2022-02-10 RX ORDER — ACETAMINOPHEN 325 MG/1
650 TABLET ORAL EVERY 4 HOURS PRN
Status: DISCONTINUED | OUTPATIENT
Start: 2022-02-10 | End: 2022-02-10

## 2022-02-10 RX ORDER — PROMETHAZINE HYDROCHLORIDE 12.5 MG/1
12.5 SUPPOSITORY RECTAL EVERY 6 HOURS PRN
Status: DISCONTINUED | OUTPATIENT
Start: 2022-02-10 | End: 2022-02-10

## 2022-02-10 RX ORDER — ONDANSETRON 2 MG/ML
4 INJECTION INTRAMUSCULAR; INTRAVENOUS ONCE AS NEEDED
Status: DISCONTINUED | OUTPATIENT
Start: 2022-02-10 | End: 2022-02-10 | Stop reason: HOSPADM

## 2022-02-10 RX ORDER — OXYTOCIN/0.9 % SODIUM CHLORIDE 30/500 ML
85 PLASTIC BAG, INJECTION (ML) INTRAVENOUS ONCE
Status: COMPLETED | OUTPATIENT
Start: 2022-02-10 | End: 2022-02-10

## 2022-02-10 RX ORDER — MORPHINE SULFATE 2 MG/ML
6 INJECTION, SOLUTION INTRAMUSCULAR; INTRAVENOUS
Status: DISCONTINUED | OUTPATIENT
Start: 2022-02-10 | End: 2022-02-10

## 2022-02-10 RX ORDER — ONDANSETRON 2 MG/ML
4 INJECTION INTRAMUSCULAR; INTRAVENOUS EVERY 6 HOURS PRN
Status: DISCONTINUED | OUTPATIENT
Start: 2022-02-10 | End: 2022-02-11 | Stop reason: HOSPADM

## 2022-02-10 RX ORDER — TRISODIUM CITRATE DIHYDRATE AND CITRIC ACID MONOHYDRATE 500; 334 MG/5ML; MG/5ML
30 SOLUTION ORAL ONCE
Status: DISCONTINUED | OUTPATIENT
Start: 2022-02-10 | End: 2022-02-10 | Stop reason: HOSPADM

## 2022-02-10 RX ORDER — ONDANSETRON 4 MG/1
4 TABLET, FILM COATED ORAL ONCE AS NEEDED
Status: DISCONTINUED | OUTPATIENT
Start: 2022-02-10 | End: 2022-02-10 | Stop reason: HOSPADM

## 2022-02-10 RX ORDER — METHYLERGONOVINE MALEATE 0.2 MG/ML
200 INJECTION INTRAVENOUS ONCE AS NEEDED
Status: DISCONTINUED | OUTPATIENT
Start: 2022-02-10 | End: 2022-02-10 | Stop reason: HOSPADM

## 2022-02-10 RX ORDER — PRENATAL VIT/IRON FUM/FOLIC AC 27MG-0.8MG
1 TABLET ORAL DAILY
Status: DISCONTINUED | OUTPATIENT
Start: 2022-02-10 | End: 2022-02-11 | Stop reason: HOSPADM

## 2022-02-10 RX ORDER — PROMETHAZINE HYDROCHLORIDE 12.5 MG/1
12.5 SUPPOSITORY RECTAL EVERY 6 HOURS PRN
Status: DISCONTINUED | OUTPATIENT
Start: 2022-02-10 | End: 2022-02-11 | Stop reason: HOSPADM

## 2022-02-10 RX ORDER — MORPHINE SULFATE 4 MG/ML
4 INJECTION, SOLUTION INTRAMUSCULAR; INTRAVENOUS ONCE AS NEEDED
Status: DISCONTINUED | OUTPATIENT
Start: 2022-02-10 | End: 2022-02-10 | Stop reason: HOSPADM

## 2022-02-10 RX ORDER — MORPHINE SULFATE 4 MG/ML
4 INJECTION, SOLUTION INTRAMUSCULAR; INTRAVENOUS EVERY 4 HOURS PRN
Status: DISCONTINUED | OUTPATIENT
Start: 2022-02-10 | End: 2022-02-10

## 2022-02-10 RX ORDER — EPHEDRINE SULFATE 5 MG/ML
5 INJECTION INTRAVENOUS
Status: DISCONTINUED | OUTPATIENT
Start: 2022-02-10 | End: 2022-02-10 | Stop reason: HOSPADM

## 2022-02-10 RX ORDER — MORPHINE SULFATE 2 MG/ML
2 INJECTION, SOLUTION INTRAMUSCULAR; INTRAVENOUS ONCE AS NEEDED
Status: DISCONTINUED | OUTPATIENT
Start: 2022-02-10 | End: 2022-02-10 | Stop reason: HOSPADM

## 2022-02-10 RX ORDER — CARBOPROST TROMETHAMINE 250 UG/ML
250 INJECTION, SOLUTION INTRAMUSCULAR ONCE AS NEEDED
Status: DISCONTINUED | OUTPATIENT
Start: 2022-02-10 | End: 2022-02-10 | Stop reason: HOSPADM

## 2022-02-10 RX ORDER — BISACODYL 10 MG
10 SUPPOSITORY, RECTAL RECTAL DAILY PRN
Status: DISCONTINUED | OUTPATIENT
Start: 2022-02-11 | End: 2022-02-11 | Stop reason: HOSPADM

## 2022-02-10 RX ORDER — SODIUM CHLORIDE 0.9 % (FLUSH) 0.9 %
1-10 SYRINGE (ML) INJECTION AS NEEDED
Status: DISCONTINUED | OUTPATIENT
Start: 2022-02-10 | End: 2022-02-11 | Stop reason: HOSPADM

## 2022-02-10 RX ORDER — HYDROCODONE BITARTRATE AND ACETAMINOPHEN 7.5; 325 MG/1; MG/1
1 TABLET ORAL EVERY 4 HOURS PRN
Status: DISCONTINUED | OUTPATIENT
Start: 2022-02-10 | End: 2022-02-11 | Stop reason: HOSPADM

## 2022-02-10 RX ORDER — HYDROCODONE BITARTRATE AND ACETAMINOPHEN 5; 325 MG/1; MG/1
1 TABLET ORAL EVERY 4 HOURS PRN
Status: DISCONTINUED | OUTPATIENT
Start: 2022-02-10 | End: 2022-02-11 | Stop reason: HOSPADM

## 2022-02-10 RX ORDER — PROMETHAZINE HYDROCHLORIDE 25 MG/1
25 TABLET ORAL EVERY 6 HOURS PRN
Status: DISCONTINUED | OUTPATIENT
Start: 2022-02-10 | End: 2022-02-11 | Stop reason: HOSPADM

## 2022-02-10 RX ORDER — EPHEDRINE SULFATE 5 MG/ML
10 INJECTION INTRAVENOUS
Status: DISCONTINUED | OUTPATIENT
Start: 2022-02-10 | End: 2022-02-10

## 2022-02-10 RX ORDER — MAGNESIUM CARB/ALUMINUM HYDROX 105-160MG
30 TABLET,CHEWABLE ORAL ONCE
Status: COMPLETED | OUTPATIENT
Start: 2022-02-10 | End: 2022-02-10

## 2022-02-10 RX ORDER — OXYTOCIN/0.9 % SODIUM CHLORIDE 30/500 ML
2-20 PLASTIC BAG, INJECTION (ML) INTRAVENOUS
Status: DISCONTINUED | OUTPATIENT
Start: 2022-02-10 | End: 2022-02-10

## 2022-02-10 RX ORDER — SODIUM CHLORIDE, SODIUM LACTATE, POTASSIUM CHLORIDE, CALCIUM CHLORIDE 600; 310; 30; 20 MG/100ML; MG/100ML; MG/100ML; MG/100ML
125 INJECTION, SOLUTION INTRAVENOUS CONTINUOUS
Status: DISCONTINUED | OUTPATIENT
Start: 2022-02-10 | End: 2022-02-10

## 2022-02-10 RX ORDER — IBUPROFEN 600 MG/1
600 TABLET ORAL EVERY 6 HOURS PRN
Status: DISCONTINUED | OUTPATIENT
Start: 2022-02-10 | End: 2022-02-11 | Stop reason: HOSPADM

## 2022-02-10 RX ORDER — ONDANSETRON 4 MG/1
4 TABLET, FILM COATED ORAL EVERY 8 HOURS PRN
Status: DISCONTINUED | OUTPATIENT
Start: 2022-02-10 | End: 2022-02-11 | Stop reason: HOSPADM

## 2022-02-10 RX ORDER — SODIUM CHLORIDE 0.9 % (FLUSH) 0.9 %
10 SYRINGE (ML) INJECTION EVERY 12 HOURS SCHEDULED
Status: DISCONTINUED | OUTPATIENT
Start: 2022-02-10 | End: 2022-02-10

## 2022-02-10 RX ORDER — LIDOCAINE HYDROCHLORIDE 10 MG/ML
5 INJECTION, SOLUTION EPIDURAL; INFILTRATION; INTRACAUDAL; PERINEURAL AS NEEDED
Status: DISCONTINUED | OUTPATIENT
Start: 2022-02-10 | End: 2022-02-10

## 2022-02-10 RX ORDER — OXYTOCIN/0.9 % SODIUM CHLORIDE 30/500 ML
650 PLASTIC BAG, INJECTION (ML) INTRAVENOUS ONCE
Status: COMPLETED | OUTPATIENT
Start: 2022-02-10 | End: 2022-02-10

## 2022-02-10 RX ORDER — PROMETHAZINE HYDROCHLORIDE 12.5 MG/1
12.5 TABLET ORAL EVERY 6 HOURS PRN
Status: DISCONTINUED | OUTPATIENT
Start: 2022-02-10 | End: 2022-02-10

## 2022-02-10 RX ADMIN — SODIUM CHLORIDE, POTASSIUM CHLORIDE, SODIUM LACTATE AND CALCIUM CHLORIDE 125 ML/HR: 600; 310; 30; 20 INJECTION, SOLUTION INTRAVENOUS at 10:30

## 2022-02-10 RX ADMIN — Medication 650 ML/HR: at 12:34

## 2022-02-10 RX ADMIN — Medication 85 ML/HR: at 13:10

## 2022-02-10 RX ADMIN — HYDROCODONE BITARTRATE AND ACETAMINOPHEN 1 TABLET: 5; 325 TABLET ORAL at 21:40

## 2022-02-10 RX ADMIN — SODIUM CHLORIDE, POTASSIUM CHLORIDE, SODIUM LACTATE AND CALCIUM CHLORIDE 125 ML/HR: 600; 310; 30; 20 INJECTION, SOLUTION INTRAVENOUS at 05:45

## 2022-02-10 RX ADMIN — Medication 12 ML/HR: at 10:28

## 2022-02-10 RX ADMIN — PRENATAL VITAMINS-IRON FUMARATE 27 MG IRON-FOLIC ACID 0.8 MG TABLET 1 TABLET: at 16:21

## 2022-02-10 RX ADMIN — Medication 2 MILLI-UNITS/MIN: at 05:55

## 2022-02-10 RX ADMIN — SODIUM CHLORIDE, POTASSIUM CHLORIDE, SODIUM LACTATE AND CALCIUM CHLORIDE 1000 ML: 600; 310; 30; 20 INJECTION, SOLUTION INTRAVENOUS at 09:25

## 2022-02-10 RX ADMIN — MINERAL OIL 30 ML: 1000 SOLUTION ORAL at 12:27

## 2022-02-10 RX ADMIN — IBUPROFEN 600 MG: 600 TABLET ORAL at 16:21

## 2022-02-10 RX ADMIN — DOCUSATE SODIUM 100 MG: 100 CAPSULE, LIQUID FILLED ORAL at 21:38

## 2022-02-10 NOTE — NON STRESS TEST
Camille Still, a  at 39w3d with an JUAN LUIS of 2022, by Last Menstrual Period, was seen at [facility] for a nonstress test.    Chief Complaint   Patient presents with   • Scheduled Induction       Patient Active Problem List   Diagnosis   • Rh negative status during pregnancy   • Unplanned pregnancy   • Short interval between pregnancies affecting pregnancy, antepartum   • Request for sterilization   • Pregnancy       Start Time: 515  Stop Time: 555    Interpretation A  Nonstress Test Interpretation A: Reactive (02/10/22 0555 : Alice Islas, RN)

## 2022-02-10 NOTE — PLAN OF CARE
Goal Outcome Evaluation:               VSS. Pain controlled w PRN Ibuprofen 1x. Patient bonding with and breastfeeding baby. No complaints at this time.

## 2022-02-10 NOTE — ANESTHESIA PREPROCEDURE EVALUATION
Anesthesia Evaluation     Patient summary reviewed and Nursing notes reviewed   no history of anesthetic complications:  NPO Solid Status: > 8 hours  NPO Liquid Status: > 8 hours           Airway   Mallampati: II  TM distance: >3 FB  Neck ROM: full  no difficulty expected  Dental - normal exam     Pulmonary - negative pulmonary ROS and normal exam   Cardiovascular - negative cardio ROS and normal exam    Rhythm: regular  Rate: normal        Neuro/Psych  (+) headaches,    GI/Hepatic/Renal/Endo - negative ROS     Musculoskeletal (-) negative ROS    Abdominal    Substance History - negative use     OB/GYN    (+) Pregnant,         Other - negative ROS                       Anesthesia Plan    ASA 2 - emergent     epidural   (Risks of epidural analgesia discussed, including but not limited to:  Headache, itching, n&v, infection, failure, decreased bp, decreased fetal hr, possible c/s. Permanent chronic back pain, nerve damage, paralysis, etc.    Patient told that epidural analgesia may not relieve all the labor pain, that she may still feel pressure and that she may still feel pain as the baby is close to delivery.     All questions answered and informed consent obtained.    )    Anesthetic plan, all risks, benefits, and alternatives have been provided, discussed and informed consent has been obtained with: patient.        CODE STATUS:    Level Of Support Discussed With: Patient  Code Status (Patient has no pulse and is not breathing): CPR (Attempt to Resuscitate)  Medical Interventions (Patient has pulse or is breathing): Full

## 2022-02-10 NOTE — ANESTHESIA PROCEDURE NOTES
Labor Epidural    Pre-sedation assessment completed: 2/10/2022 10:00 AM    Patient reassessed immediately prior to procedure    Patient location during procedure: OB  Start Time: 2/10/2022 10:05 AM  Stop Time: 2/10/2022 10:15 AM  Indication:at surgeon's request  Performed By  CRNA: Carlton Tran CRNA  Preanesthetic Checklist  Completed: patient identified, IV checked, site marked, risks and benefits discussed, surgical consent, monitors and equipment checked, pre-op evaluation and timeout performed  Additional Notes  Risks of epidural analgesia discussed , including but not limited to:  Headache, itching, nausea and vomiting, infection, failure, decreased BP, decreased fetal heart rate, possible . Permanent chronic back pain, nerve damage, paralysis, etc    Skin localized with lidocaine skin wheal.  Test dose 4 ml of 1.5% lidocaine with 1:200,000 Epinephrine - Negative    Bolus dose of 10 ml Fentanyl/Ropivacaine solution given and epidural infusion started.  Prep:  Pt Position:sitting  Sterile Tech:cap, gloves, mask and sterile barrier  Prep:chlorhexidine gluconate and isopropyl alcohol  Monitoring:blood pressure monitoring and continuous pulse oximetry  Epidural Block Procedure:  Approach:midline  Guidance:landmark technique and palpation technique  Location:L3-L4  Needle Type:Tuohy  Needle Gauge:18 G  Loss of Resistance Medium: saline  Loss of Resistance: 5cm  Cath Depth at skin:11 cm  Paresthesia: none  Aspiration:negative  Test Dose:negative  Number of Attempts: 1  Post Assessment:  Dressing:occlusive dressing applied and secured with tape  Pt Tolerance:patient tolerated the procedure well with no apparent complications  Complications:no

## 2022-02-10 NOTE — L&D DELIVERY NOTE
KAYLYN Malin  Vaginal Delivery Note   Review the Delivery Report for details.       Delivery     Delivery: Vaginal, Spontaneous     YOB: 2024    Time of Birth:  Gestational Age 12:29 PM   39w3d     Anesthesia: Epidural     Delivering clinician: Subha Arteaga    Forceps?   No   Vacuum? No    Shoulder dystocia present: No        Delivery narrative:  Camille progressed to C+P and pushed effectively. Mouth and nose bulb suctioned on perineum. Nuchal cord reduced over head. Anterior shoulder was delivered easily with gentle traction and maternal effort followed by posterior shoulder.  viable female. Infant stimulated, dried, and placed on mom's abdomen. Infant with lusty cry and spontaneous respirations. Delayed cord double clamped; cut by dad. Cord blood obtained. Placenta delivered Schultze intact with 3V cord. Pitocin 20 units IV given. Second degree perineal laceration repaired. FF @ U, light rubra lochia.     Infant    Findings: female  infant     Infant observations: Weight: 3572 g (7 lb 14 oz)   Length: 21  in  Observations/Comments:        Apgars: 9  @ 1 minute /    9  @ 5 minutes   Infant Name: Rajan     Placenta, Cord, and Fluid    Placenta delivered  Spontaneous  at   2/10/2022 12:34 PM     Cord: 3 vessels  present.   Nuchal Cord?  yes; Number of nuchal loops present:  1    Cord blood obtained: Yes    Cord gases obtained:      Cord gas results: Venous:  No results found for: PHCVEN    Arterial:  No results found for: PHCART     Repair    Episiotomy: None     No    Lacerations: Yes  Laceration Information  Laceration Repaired?   Perineal: 2nd  Yes    Periurethral:       Labial:       Sulcus:       Vaginal:       Cervical:         Suture used for repair: 3-0 chromic gut   Estimated Blood Loss: Est. Blood Loss (mL): 300 mL (Filed from Delivery Summary) (02/10/22 9572)             Quantitative Blood Loss:                  Complications  none    Disposition  Mother to Mother Baby/Postpartum   in stable condition currently.  Baby remains with mom  in stable condition currently.      Subha Arteaga CNM  02/10/22  15:06 EST

## 2022-02-10 NOTE — PLAN OF CARE
Problem: Adult Inpatient Plan of Care  Goal: Plan of Care Review  Outcome: Ongoing, Progressing  Flowsheets (Taken 2/10/2022 0624)  Outcome Summary: Vital signs stable. Pitocin per protocol  Goal: Patient-Specific Goal (Individualized)  Outcome: Ongoing, Progressing  Goal: Absence of Hospital-Acquired Illness or Injury  Outcome: Ongoing, Progressing  Intervention: Identify and Manage Fall Risk  Recent Flowsheet Documentation  Taken 2/10/2022 0600 by Sha Ruiz RN  Safety Promotion/Fall Prevention:   safety round/check completed   room organization consistent   nonskid shoes/slippers when out of bed   fall prevention program maintained   clutter free environment maintained  Goal: Optimal Comfort and Wellbeing  Outcome: Ongoing, Progressing  Intervention: Provide Person-Centered Care  Recent Flowsheet Documentation  Taken 2/10/2022 0600 by Sha Ruiz RN  Trust Relationship/Rapport:   care explained   choices provided   emotional support provided   empathic listening provided   questions answered   reassurance provided   thoughts/feelings acknowledged   questions encouraged  Goal: Readiness for Transition of Care  Outcome: Ongoing, Progressing   Goal Outcome Evaluation:              Outcome Summary: Vital signs stable. Pitocin per protocol

## 2022-02-10 NOTE — PROGRESS NOTES
Kimo Still  : 1995  MRN: [id number]  CSN: [id number]    Labor progress note    Subjective   She reports comfortable with epidural     Objective   Min/max vitals past 24 hours:  Temp  Min: 97.7 °F (36.5 °C)  Max: 98.3 °F (36.8 °C)   BP  Min: 95/78  Max: 122/79   Pulse  Min: 62  Max: 125   Resp  Min: 16  Max: 16        FHT's: reassuring, reactive and category 1   Cervix: was checked (by me): 5 cm / 80 % / -1   Contractions: regular every 3 minutes - external monitors used Pitocin @ 8 mu      Assessment   1. IUP at 39w3d  2. Fetal status reassuring     Plan   1. Continue with induction  2. AROM - clear fluid seen     Subha Arteaga, APRN  2/10/2022  11:36 EST

## 2022-02-10 NOTE — H&P
KAYLYN Malin  Camille Freeman  : 1995  MRN: [id number]  [facility]: [id number]    History and Physical    Chief Complaint   Patient presents with   • Scheduled Induction      Subjective   Camille Freeman is a 26 y.o. year old  with an Estimated Date of Delivery: 22 currently 39w3d presenting for induction of labor for elective at term per patient request.     Risks of labor induction including prolongation of labor, increased risks for both  section and operative vaginal birth have been discussed at length.     Prenatal care has been with [facility].  It has been complicated by anemia. She had Covid in November. First PNV on 8/3/21 @ 12 weeks with 10 visits. Weight gain = 16 lbs.     OB History    Para Term  AB Living   4 3 3 0 0 3   SAB IAB Ectopic Molar Multiple Live Births   0 0 0 0 0 3      # Outcome Date GA Lbr Barry/2nd Weight Sex Delivery Anes PTL Lv   4 Current            3 Term 20 39w4d 03:22 / 00:35 3685 g (8 lb 2 oz) F Vag-Spont Spinal N TAYLOR      Name: TENZIN FREEMAN      Apgar1: 9  Apgar5: 9   2 Term 19 39w3d 06:04 / 00:10 3629 g (8 lb) M Vag-Spont EPI N TAYLOR      Name: GEE FREEMANBRIDGETT      Apgar1: 8  Apgar5: 9   1 Term 16 39w0d  3629 g (8 lb) F Vag-Spont EPI  TAYLOR     Past Medical History:   Diagnosis Date   • Migraine    • Patient denies medical problems    • Rh negative status during pregnancy 2018     Past Surgical History:   Procedure Laterality Date   • NO PAST SURGERIES         Current Facility-Administered Medications:   •  acetaminophen (TYLENOL) tablet 650 mg, 650 mg, Oral, Q4H PRN, Subha Arteaga CNM  •  acetaminophen (TYLENOL) tablet 650 mg, 650 mg, Oral, Once PRN, Subha Arteaga CNM  •  carboprost (HEMABATE) injection 250 mcg, 250 mcg, Intramuscular, Once PRN, Subha Arteaga, BRENDENM  •  ePHEDrine Sulfate 5 MG/ML injection 10 mg, 10 mg, Intravenous, Q10 Min PRN, Subha Arteaga CNM  •  lactated  ringers bolus 1,000 mL, 1,000 mL, Intravenous, Once, Subha Arteaga CNM  •  lactated ringers bolus 1,000 mL, 1,000 mL, Intravenous, Once, Subha Arteaga CNM  •  lactated ringers infusion, 125 mL/hr, Intravenous, Continuous, Subha Arteaga CNM, Last Rate: 125 mL/hr at 02/10/22 0545, 125 mL/hr at 02/10/22 0545  •  lidocaine PF 1% (XYLOCAINE) injection 5 mL, 5 mL, Intradermal, PRN, Subha Arteaga CNM  •  methylergonovine (METHERGINE) injection 200 mcg, 200 mcg, Intramuscular, Once PRN, Subha Arteaga CNM  •  mineral oil liquid 30 mL, 30 mL, Topical, Once, Subha Arteaga CNM  •  miSOPROStol (CYTOTEC) tablet 800 mcg, 800 mcg, Rectal, PRN, Subha Arteaga CNM  •  Morphine sulfate (PF) injection 2 mg, 2 mg, Intravenous, Once PRN, Subha Arteaga CNM  •  Morphine sulfate (PF) injection 4 mg, 4 mg, Intravenous, Q4H PRN, Subha Arteaga CNM  •  Morphine sulfate (PF) injection 4 mg, 4 mg, Intravenous, Once PRN, Subha Arteaga CNM  •  Morphine sulfate (PF) injection 6 mg, 6 mg, Intravenous, Q2H PRN, Subha Arteaga CNM  •  ondansetron (ZOFRAN) tablet 4 mg, 4 mg, Oral, Once PRN **OR** ondansetron (ZOFRAN) injection 4 mg, 4 mg, Intravenous, Once PRN, Subha Arteaga CNM  •  oxytocin (PITOCIN) 30 units in 0.9% sodium chloride 500 mL (premix), 2-20 maria e-units/min, Intravenous, Titrated, Subha Arteaga CNM, Last Rate: 4 mL/hr at 02/10/22 0630, 4 maria e-units/min at 02/10/22 0630  •  oxytocin (PITOCIN) 30 units in 0.9% sodium chloride 500 mL (premix), 650 mL/hr, Intravenous, Once **FOLLOWED BY** oxytocin (PITOCIN) 30 units in 0.9% sodium chloride 500 mL (premix), 85 mL/hr, Intravenous, Once, Subha Arteaga CNM  •  promethazine (PHENERGAN) suppository 12.5 mg, 12.5 mg, Rectal, Q6H PRN **OR** promethazine (PHENERGAN) tablet 12.5 mg, 12.5 mg, Oral, Q6H PRN, Subha Arteaga CNM  •  ropivacaine 0.2% + fentaNYL 2mcg/mL NS epidural, 12 mL/hr, Epidural, Continuous, Subha Arteaga,  "CNM  •  sodium chloride 0.9 % flush 1-10 mL, 1-10 mL, Intravenous, PRN, Subha Arteaga CNM  •  sodium chloride 0.9 % flush 10 mL, 10 mL, Intravenous, Q12H, Subha Arteaga CNM    No Known Allergies  Social History    Tobacco Use      Smoking status: Never Smoker      Smokeless tobacco: Never Used    Review of Systems   Constitutional: Negative.    Respiratory: Negative.    Cardiovascular: Negative.    Gastrointestinal: Negative.    Musculoskeletal: Negative.    Psychiatric/Behavioral: Negative.    All other systems reviewed and are negative.        Objective   /79   Pulse 81   Temp 98 °F (36.7 °C) (Axillary)   Resp 16   Ht 170.2 cm (67\")   Wt 78.9 kg (174 lb)   LMP 05/10/2021   SpO2 100%   Breastfeeding Yes   BMI 27.25 kg/m²                                           General:  well developed; well nourished  no acute distress   Neck:    Heart:   supple and no masses  normal apical impulse  regular rate and rhythm   Lungs: breathing is unlabored  clear to auscultation bilaterally   Abdomen: gravid  EFW: 7.5-8#, growth scan @ 32 wks 54%, anterior placenta   FHT's: reassuring, reactive and category 1   Cervix: was checked (by Dr Calderon): 4 cm / 80 % / -1   Presentation: cephalic   Contractions: irregular every 2-5 minutes - external monitors used Pitocin @ 4 mu   Extremities:   normal appearance with no cyanosis or edema and no calf tenderness   Skin:  Skin color, texture, turgor normal. No rashes or lesions or Skin warm and dry   Psych:  Normal. and Alert and oriented, appropriate affect.       Prenatal Labs  Lab Results   Component Value Date    HGB 9.8 (L) 02/10/2022    HEPBSAG Negative 08/03/2021    ABSCRN Negative 02/10/2022    OMZ7ARX8 Non Reactive 08/03/2021    HEPCVIRUSABY <0.1 08/03/2021    STREPGPB Negative 01/24/2022    URINECX Final report 06/06/2018       Current Labs Reviewed   Lab Results (last 24 hours)     Procedure Component Value Units Date/Time    COVID PRE-OP / PRE-PROCEDURE " SCREENING ORDER (NO ISOLATION) - Swab, Nasal Cavity [[id number]]  (Normal) Collected: 02/10/22 0536    Specimen: Swab from Nasal Cavity Updated: 02/10/22 0629    Narrative:      The following orders were created for panel order COVID PRE-OP / PRE-PROCEDURE SCREENING ORDER (NO ISOLATION) - Swab, Nasal Cavity.  Procedure                               Abnormality         Status                     ---------                               -----------         ------                     COVID-19,[facility]...[[id number]]  Normal              Final result                 Please view results for these tests on the individual orders.    COVID-19,[facility] IN-HOUSE,Nasal Swab No Transport Media 3-4 HR TAT - Swab, Nasal Cavity [[id number]]  (Normal) Collected: 02/10/22 0536    Specimen: Swab from Nasal Cavity Updated: 02/10/22 0629     COVID19 Not Detected    Narrative:      Fact sheet for providers: https://www.fda.gov/media/420453/download     Fact sheet for patients: https://www.fda.gov/media/190959/download    Test performed by PCR.    Consider negative results in combination with clinical observations, patient history, and epidemiological information.    CBC & Differential [[id number]]  (Abnormal) Collected: 02/10/22 0549    Specimen: Blood Updated: 02/10/22 0557    Narrative:      The following orders were created for panel order CBC & Differential.  Procedure                               Abnormality         Status                     ---------                               -----------         ------                     CBC Auto Differential[[id number]]        Abnormal            Final result                 Please view results for these tests on the individual orders.    CBC Auto Differential [[id number]]  (Abnormal) Collected: 02/10/22 0549    Specimen: Blood Updated: 02/10/22 0557     WBC 7.12 10*3/mm3      RBC 3.14 10*6/mm3      Hemoglobin 9.8 g/dL      Hematocrit 28.7 %      MCV 91.4 fL      MCH 31.2 pg      MCHC 34.1  g/dL      RDW 12.2 %      RDW-SD 40.7 fl      MPV 11.2 fL      Platelets 178 10*3/mm3      Neutrophil % 61.3 %      Lymphocyte % 28.1 %      Monocyte % 6.5 %      Eosinophil % 3.1 %      Basophil % 0.4 %      Immature Grans % 0.6 %      Neutrophils, Absolute 4.37 10*3/mm3      Lymphocytes, Absolute 2.00 10*3/mm3      Monocytes, Absolute 0.46 10*3/mm3      Eosinophils, Absolute 0.22 10*3/mm3      Basophils, Absolute 0.03 10*3/mm3      Immature Grans, Absolute 0.04 10*3/mm3      nRBC 0.0 /100 WBC              ASSESSMENT  1. IUP at 39w3d  2. Induction of labor because of elective at term per patient request  3. Group B strep status: negative  4. Reactive NST    PLAN  1. Admit to   2. Pitocin induction, increase per protocol  3. All procedures explained to patient and spouse. Questions answered and verbalized understanding.  4. AROM after epidural  5. Anticipate     Subha Arteaga, APRN  2/10/2022  08:21 EST

## 2022-02-10 NOTE — PLAN OF CARE
Goal Outcome Evaluation:  Plan of Care Reviewed With: patient        Progress: no change  Outcome Summary: ADMISSION FOR INDUCTION

## 2022-02-11 VITALS
HEIGHT: 67 IN | SYSTOLIC BLOOD PRESSURE: 109 MMHG | DIASTOLIC BLOOD PRESSURE: 70 MMHG | BODY MASS INDEX: 27.31 KG/M2 | WEIGHT: 174 LBS | OXYGEN SATURATION: 98 % | HEART RATE: 77 BPM | RESPIRATION RATE: 16 BRPM | TEMPERATURE: 97.8 F

## 2022-02-11 LAB
ABO GROUP BLD: NORMAL
BASOPHILS # BLD AUTO: 0.01 10*3/MM3 (ref 0–0.2)
BASOPHILS NFR BLD AUTO: 0.1 % (ref 0–1.5)
DEPRECATED RDW RBC AUTO: 41.1 FL (ref 37–54)
EOSINOPHIL # BLD AUTO: 0.18 10*3/MM3 (ref 0–0.4)
EOSINOPHIL NFR BLD AUTO: 2.4 % (ref 0.3–6.2)
ERYTHROCYTE [DISTWIDTH] IN BLOOD BY AUTOMATED COUNT: 12.1 % (ref 12.3–15.4)
FETAL BLEED: NEGATIVE
HCT VFR BLD AUTO: 31.6 % (ref 34–46.6)
HGB BLD-MCNC: 10.5 G/DL (ref 12–15.9)
IMM GRANULOCYTES # BLD AUTO: 0.03 10*3/MM3 (ref 0–0.05)
IMM GRANULOCYTES NFR BLD AUTO: 0.4 % (ref 0–0.5)
LYMPHOCYTES # BLD AUTO: 2.73 10*3/MM3 (ref 0.7–3.1)
LYMPHOCYTES NFR BLD AUTO: 35.8 % (ref 19.6–45.3)
MCH RBC QN AUTO: 30.7 PG (ref 26.6–33)
MCHC RBC AUTO-ENTMCNC: 33.2 G/DL (ref 31.5–35.7)
MCV RBC AUTO: 92.4 FL (ref 79–97)
MONOCYTES # BLD AUTO: 0.45 10*3/MM3 (ref 0.1–0.9)
MONOCYTES NFR BLD AUTO: 5.9 % (ref 5–12)
NEUTROPHILS NFR BLD AUTO: 4.23 10*3/MM3 (ref 1.7–7)
NEUTROPHILS NFR BLD AUTO: 55.4 % (ref 42.7–76)
NRBC BLD AUTO-RTO: 0 /100 WBC (ref 0–0.2)
NUMBER OF DOSES: NORMAL
PLATELET # BLD AUTO: 150 10*3/MM3 (ref 140–450)
PMV BLD AUTO: 11.1 FL (ref 6–12)
RBC # BLD AUTO: 3.42 10*6/MM3 (ref 3.77–5.28)
RH BLD: NEGATIVE
WBC NRBC COR # BLD: 7.63 10*3/MM3 (ref 3.4–10.8)

## 2022-02-11 PROCEDURE — 85461 HEMOGLOBIN FETAL: CPT | Performed by: MIDWIFE

## 2022-02-11 PROCEDURE — 86901 BLOOD TYPING SEROLOGIC RH(D): CPT | Performed by: MIDWIFE

## 2022-02-11 PROCEDURE — 85025 COMPLETE CBC W/AUTO DIFF WBC: CPT | Performed by: MIDWIFE

## 2022-02-11 PROCEDURE — 25010000002 RHO D IMMUNE GLOBULIN 1500 UNIT/2ML SOLUTION PREFILLED SYRINGE: Performed by: MIDWIFE

## 2022-02-11 PROCEDURE — 86900 BLOOD TYPING SEROLOGIC ABO: CPT | Performed by: MIDWIFE

## 2022-02-11 RX ORDER — IBUPROFEN 600 MG/1
600 TABLET ORAL EVERY 6 HOURS PRN
Qty: 60 TABLET | Refills: 0 | Status: SHIPPED | OUTPATIENT
Start: 2022-02-11 | End: 2022-04-21

## 2022-02-11 RX ORDER — FERROUS SULFATE TAB EC 324 MG (65 MG FE EQUIVALENT) 324 (65 FE) MG
324 TABLET DELAYED RESPONSE ORAL 2 TIMES DAILY WITH MEALS
Status: DISCONTINUED | OUTPATIENT
Start: 2022-02-11 | End: 2022-02-11 | Stop reason: HOSPADM

## 2022-02-11 RX ADMIN — PRENATAL VITAMINS-IRON FUMARATE 27 MG IRON-FOLIC ACID 0.8 MG TABLET 1 TABLET: at 09:33

## 2022-02-11 RX ADMIN — HUMAN RHO(D) IMMUNE GLOBULIN 1500 UNITS: 1500 SOLUTION INTRAMUSCULAR; INTRAVENOUS at 13:36

## 2022-02-11 RX ADMIN — HYDROCODONE BITARTRATE AND ACETAMINOPHEN 1 TABLET: 7.5; 325 TABLET ORAL at 01:40

## 2022-02-11 RX ADMIN — IBUPROFEN 600 MG: 600 TABLET ORAL at 09:33

## 2022-02-11 RX ADMIN — DOCUSATE SODIUM 100 MG: 100 CAPSULE, LIQUID FILLED ORAL at 09:33

## 2022-02-11 NOTE — PLAN OF CARE
Goal Outcome Evaluation:      VSS, I & O adequate, patient desires to be discharge due to NB transfer.

## 2022-02-11 NOTE — PROGRESS NOTES
Kimo Still  : 1995  MRN: [id number]  CSN: [id number]    Postpartum Day #1  Subjective   Her pain is well controlled.  Vaginal bleeding is appropriate amount. She is voiding without difficulty. She is breast feeding .     Objective     Min/max vitals past 24 hours:   Temp  Min: 97.7 °F (36.5 °C)  Max: 98.5 °F (36.9 °C)  BP  Min: 93/53  Max: 122/79  Pulse  Min: 58  Max: 105  Resp  Min: 14  Max: 18        General: well developed; well nourished  no acute distress   Abdomen: fundus firm and non-tender   Pelvic: Not performed   Ext: Calves NT     Lab Results   Component Value Date    WBC 7.63 2022    HGB 10.5 (L) 2022    HCT 31.6 (L) 2022    MCV 92.4 2022     2022    ABO O 2022    RH Negative 2022    RUBELLAABIGG 1.30 2021    HEPBSAG Negative 2021        Assessment   1. Postpartum Day #1 S/P vaginal delivery   2. Postpartum anemia     Plan   1. Continue routine postpartum care   2. Ferrous Sulfate BID    This note was electronically signed  Subha Arteaga, ANTHONY  2022  08:38 EST

## 2022-02-11 NOTE — DISCHARGE SUMMARY
Discharge Summary     Kimo Still  : 1995  MRN: [id number]  [facility]: [id number]    Date of Admission: 2/10/2022   Date of Discharge:  2022   Delivering Physician: Subha THOMAS Foster        Admission Diagnosis: 1. Pregnancy [Z34.90]   Discharge Diagnosis: 1. Same as above plus  2. Pregnancy at 39w3d - delivered       Procedures: 2/10/2022  - Vaginal, Spontaneous       Hospital Course  Patient is a 26 y.o.  who at 39w3d had a vaginal birth.  Her postpartum course was without complications.  On PPD #1, her baby was being transferred to [facility] with possible Ileus and  She requested to be discharged.  She had normal lochia and pain well controlled with oral medications. She cancelled her tubal and is undecided on birth control. She is breast feeding .    Infant  female  fetus weighing 3572 g (7 lb 14 oz)   Apgars -  9 @ 1 minute /  9 @ 5 minutes.    Discharge labs  Lab Results   Component Value Date    WBC 7.63 2022    HGB 10.5 (L) 2022    HCT 31.6 (L) 2022     2022       Discharge Medications     Discharge Medications      New Medications      Instructions Start Date   ibuprofen 600 MG tablet  Commonly known as: ADVIL,MOTRIN   600 mg, Oral, Every 6 Hours PRN         Continue These Medications      Instructions Start Date   acetaminophen 500 MG tablet  Commonly known as: TYLENOL   500 mg, Oral, Every 4 Hours PRN      clotrimazole-betamethasone 1-0.05 % cream  Commonly known as: Lotrisone   1 application, Topical, 2 Times Daily, BID x 14 days      famotidine 20 MG tablet  Commonly known as: Pepcid   20 mg, Oral, Daily      ferrous sulfate 325 (65 FE) MG tablet   325 mg, Oral, 2 Times Daily Before Meals         Stop These Medications    nystatin 869067 UNIT/GM cream  Commonly known as: MYCOSTATIN     nystatin 547298 UNIT/GM powder  Commonly known as: MYCOSTATIN     VitaPearl 30-1.4-200 MG capsule controlled-release            Discharge Disposition Home or  Self Care   Condition on Discharge: good   Follow-up: 6 weeks with SHANTI Malin     Time spent on discharge: 30 minutes or less  Subha Arteaga, APRN  2/11/2022

## 2022-02-11 NOTE — PLAN OF CARE
Goal Outcome Evaluation:      Mother stable, doing well. Vs,lochia,I/O are wnl. Has  baby. Pain controlled. Ambulating and tolerating po.

## 2022-04-21 ENCOUNTER — OFFICE VISIT (OUTPATIENT)
Dept: OBSTETRICS AND GYNECOLOGY | Facility: CLINIC | Age: 27
End: 2022-04-21

## 2022-04-21 VITALS
BODY MASS INDEX: 23.23 KG/M2 | WEIGHT: 148 LBS | HEIGHT: 67 IN | SYSTOLIC BLOOD PRESSURE: 110 MMHG | DIASTOLIC BLOOD PRESSURE: 72 MMHG

## 2022-04-21 DIAGNOSIS — Z30.014 ENCOUNTER FOR INITIAL PRESCRIPTION OF INTRAUTERINE CONTRACEPTIVE DEVICE (IUD): Primary | ICD-10-CM

## 2022-04-21 PROBLEM — O26.899 RH NEGATIVE STATUS DURING PREGNANCY: Status: RESOLVED | Noted: 2018-06-06 | Resolved: 2022-04-21

## 2022-04-21 PROBLEM — Z34.90 UNPLANNED PREGNANCY: Status: RESOLVED | Noted: 2021-08-03 | Resolved: 2022-04-21

## 2022-04-21 PROBLEM — Z67.91 RH NEGATIVE STATUS DURING PREGNANCY: Status: RESOLVED | Noted: 2018-06-06 | Resolved: 2022-04-21

## 2022-04-21 PROBLEM — O09.899 SHORT INTERVAL BETWEEN PREGNANCIES AFFECTING PREGNANCY, ANTEPARTUM: Status: RESOLVED | Noted: 2021-08-03 | Resolved: 2022-04-21

## 2022-04-21 PROBLEM — Z30.2 REQUEST FOR STERILIZATION: Status: RESOLVED | Noted: 2021-08-03 | Resolved: 2022-04-21

## 2022-04-21 PROBLEM — Z34.90 PREGNANCY: Status: RESOLVED | Noted: 2022-02-10 | Resolved: 2022-04-21

## 2022-04-21 PROCEDURE — 99213 OFFICE O/P EST LOW 20 MIN: CPT | Performed by: MIDWIFE

## 2022-04-21 RX ORDER — LEVONORGESTREL 19.5 MG/1
1 INTRAUTERINE DEVICE INTRAUTERINE ONCE
Qty: 1 EACH | Refills: 0 | Status: SHIPPED | OUTPATIENT
Start: 2022-04-21 | End: 2022-04-22

## 2022-04-21 NOTE — PROGRESS NOTES
"History  Chief Complaint   Patient presents with   • Follow-up     Missed 6 week pp appointment , del 2/10/22, vaginal , Subha Arteaga, recent pap 21, patient is breastfeeding, patient wants to discuss birthcontrol, IUD         Camille Still is a 27 y.o. year old  presenting to be seen for gyn followup and to discuss birth control.  She had a vaginal delivery.  Her baby was transferred to [facility] due to possible Ileus and watched for 6 days. Doing well now.    Since delivery she has not been sexually active.   She does not have concerns about post-partum blues/depression.   She is breast feeding .  For ongoing contraception, her plans are IUD-Kyleena.  She has not had a menstrual cycle.         Physical  /72   Ht 170.2 cm (67\")   Wt 67.1 kg (148 lb)   Breastfeeding Yes   BMI 23.18 kg/m²     General:  well developed; well nourished  no acute distress   Abdomen: soft, non-tender; no masses   Pelvis: External genitalia:  normal appearance of the external genitalia including Bartholin's and Aynor's glands.  Uterus:  normal size, shape and consistency.  Adnexa:  normal bimanual exam of the adnexa.        Assessment   1. Normal 6 week postpartum exam  2. Contraceptive management     Plan   1. BC options reviewed and compared today: IUD - Mirena and IUD - Kyleena  2. Pap smear not due  3. Follow up for IUD insertion     New Medications Ordered This Visit   Medications   • levonorgestrel (Kyleena) 19.5 MG intrauterine device IUD     Sig: Take to prescribers office     Dispense:  1 each     Refill:  0          This note was electronically signed.  Subha Arteaga, ANTHONY  2022  "

## 2022-05-05 ENCOUNTER — OFFICE VISIT (OUTPATIENT)
Dept: OBSTETRICS AND GYNECOLOGY | Facility: CLINIC | Age: 27
End: 2022-05-05

## 2022-05-05 VITALS
BODY MASS INDEX: 23.07 KG/M2 | HEIGHT: 67 IN | WEIGHT: 147 LBS | SYSTOLIC BLOOD PRESSURE: 104 MMHG | DIASTOLIC BLOOD PRESSURE: 64 MMHG

## 2022-05-05 DIAGNOSIS — Z30.430 ENCOUNTER FOR IUD INSERTION: Primary | ICD-10-CM

## 2022-05-05 PROCEDURE — 58300 INSERT INTRAUTERINE DEVICE: CPT | Performed by: MIDWIFE

## 2022-05-05 NOTE — PROGRESS NOTES
IUD Insertion    Camille Still  No LMP recorded.    Date of procedure:  5/5/2022    Risks and benefits discussed? yes  All questions answered? yes  Consents given by The patient  Written consent obtained? yes    Local anesthesia used:  no    Procedure documentation:    After verifying the patient had a low probability of being pregnant and met the criteria for insertion, a sterile speculum has placed and the cervix was cleansed with an antiseptic solution.  Vaginal discharge was scant.  The anterior lip of the cervix was grasped with a tenaculum and the uterine cavity was gently sounded. There was no difficulty passing the sound through the cervix.  Cervical dilation did not need to be performed prior to placing the IUD.  The uterus was midposition and sounded to 7.5 cms.  The Kyleena was then prepared per the manufacturers instructions.    The Kyleena was advanced to a point 2 cms from the fundus and then the arms were released from the sheath.  The device was advanced to the fundus and the device was released fully from the sheath.. The string was cut 3 cms in length.  Bleeding from the cervix was scant.    She tolerated the procedure without any difficulty.     Post procedure instructions: It was reviewed that the Kyleena will not alter the timing of when she bleeds but it may decrease the quantity of flow and cramps.  Roughly 30% of people will be amenorrheic over time.  Efficacy rate of 99.2% over 5 years was discussed.  Spontaneous expulsion rate of 1-2% was also discussed.  If she has any issue with fever or excessive bleeding or pain she is to call the office immediately.  Otherwise I would like to see her back in 5 weeks for f/u appt.    This note was electronically signed.  ANTHONY Mckeon

## 2022-06-16 ENCOUNTER — OFFICE VISIT (OUTPATIENT)
Dept: OBSTETRICS AND GYNECOLOGY | Facility: CLINIC | Age: 27
End: 2022-06-16

## 2022-06-16 VITALS
WEIGHT: 143 LBS | BODY MASS INDEX: 22.44 KG/M2 | DIASTOLIC BLOOD PRESSURE: 68 MMHG | HEIGHT: 67 IN | SYSTOLIC BLOOD PRESSURE: 104 MMHG

## 2022-06-16 DIAGNOSIS — Z30.431 ENCOUNTER FOR ROUTINE CHECKING OF INTRAUTERINE CONTRACEPTIVE DEVICE (IUD): Primary | ICD-10-CM

## 2022-06-16 PROCEDURE — 99212 OFFICE O/P EST SF 10 MIN: CPT | Performed by: MIDWIFE

## 2022-06-16 NOTE — PROGRESS NOTES
"Subjective   Chief Complaint   Patient presents with   • Contraception     IUD Check, No Complaints/concerns      Camille Still is a 27 y.o. year old  presenting to be seen for follow-up of her recently placed Mirena.  Since the time of insertion she has had alot of spotting. Pt denies vaginal discharge. She denies fever/chills.  She denies cramping or abdominal pain. She has been sexually active.  Sanderson has not been painful for her.   Sanderson has not been painful for her partner.    OTHER COMPLAINTS:  She has had to cut out all dairy products due to breastfeeding.   C/O decreased libido             Objective   /68   Ht 170.2 cm (67\")   Wt 64.9 kg (143 lb)   BMI 22.40 kg/m²       General:  well developed; well nourished  no acute distress     Abdomen: soft, non-tender; no masses     Pelvis: External genitalia:  normal appearance of the external genitalia including Bartholin's and Neponset's glands.  Vaginal:  normal pink mucosa without prolapse or lesions.  Cervix:  normal appearance. IUD string present - 3 cms in length;       Imaging   No data reviewed       Assessment   1. Encounter for surveillance of IUD  2. Decreased libido     Plan   1. Instructions and precautions given.  All questions answered.  2. Meds were prescribed - no   3. Discussed dating, foreplay  4. Follow up 1 year    No orders of the defined types were placed in this encounter.         This note was electronically signed.  ANTHONY Mckeon      "